# Patient Record
Sex: FEMALE | Race: WHITE | NOT HISPANIC OR LATINO | Employment: OTHER | URBAN - METROPOLITAN AREA
[De-identification: names, ages, dates, MRNs, and addresses within clinical notes are randomized per-mention and may not be internally consistent; named-entity substitution may affect disease eponyms.]

---

## 2017-06-11 DIAGNOSIS — E78.5 DYSLIPIDEMIA: ICD-10-CM

## 2017-06-12 RX ORDER — ROSUVASTATIN CALCIUM 40 MG/1
40 TABLET, COATED ORAL DAILY
Qty: 90 TAB | Refills: 0 | OUTPATIENT
Start: 2017-06-12 | End: 2017-08-09 | Stop reason: SDUPTHER

## 2017-08-09 DIAGNOSIS — E78.01 FAMILIAL HYPERCHOLESTEROLEMIA: ICD-10-CM

## 2017-08-09 RX ORDER — ROSUVASTATIN CALCIUM 40 MG/1
TABLET, COATED ORAL
Qty: 90 TAB | Refills: 1 | Status: SHIPPED | OUTPATIENT
Start: 2017-08-09 | End: 2018-04-13 | Stop reason: SDUPTHER

## 2018-04-12 ENCOUNTER — TELEPHONE (OUTPATIENT)
Dept: CARDIOLOGY | Facility: MEDICAL CENTER | Age: 73
End: 2018-04-12

## 2018-04-12 NOTE — TELEPHONE ENCOUNTER
Patient has appointment tomorrow with SC Left message for patient to see if she has had any recent labs done.

## 2018-04-13 ENCOUNTER — OFFICE VISIT (OUTPATIENT)
Dept: CARDIOLOGY | Facility: MEDICAL CENTER | Age: 73
End: 2018-04-13
Payer: MEDICARE

## 2018-04-13 ENCOUNTER — TELEPHONE (OUTPATIENT)
Dept: CARDIOLOGY | Facility: MEDICAL CENTER | Age: 73
End: 2018-04-13

## 2018-04-13 VITALS
HEIGHT: 60 IN | SYSTOLIC BLOOD PRESSURE: 136 MMHG | DIASTOLIC BLOOD PRESSURE: 69 MMHG | OXYGEN SATURATION: 97 % | BODY MASS INDEX: 27.48 KG/M2 | WEIGHT: 140 LBS | HEART RATE: 58 BPM

## 2018-04-13 DIAGNOSIS — E78.01 FAMILIAL HYPERCHOLESTEROLEMIA: Chronic | ICD-10-CM

## 2018-04-13 DIAGNOSIS — R00.2 PALPITATIONS: ICD-10-CM

## 2018-04-13 DIAGNOSIS — I25.10 CORONARY ARTERY DISEASE INVOLVING NATIVE CORONARY ARTERY OF NATIVE HEART WITHOUT ANGINA PECTORIS: Chronic | ICD-10-CM

## 2018-04-13 DIAGNOSIS — R25.2 LEG CRAMPS: ICD-10-CM

## 2018-04-13 DIAGNOSIS — F17.211 CIGARETTE NICOTINE DEPENDENCE IN REMISSION: Chronic | ICD-10-CM

## 2018-04-13 DIAGNOSIS — I72.4 PSEUDOANEURYSM OF RIGHT FEMORAL ARTERY (HCC): ICD-10-CM

## 2018-04-13 DIAGNOSIS — I10 ESSENTIAL HYPERTENSION: Chronic | ICD-10-CM

## 2018-04-13 DIAGNOSIS — R06.83 SNORING: Chronic | ICD-10-CM

## 2018-04-13 PROCEDURE — 99214 OFFICE O/P EST MOD 30 MIN: CPT | Performed by: NURSE PRACTITIONER

## 2018-04-13 RX ORDER — ALLOPURINOL 100 MG/1
100 TABLET ORAL 2 TIMES DAILY
COMMUNITY
Start: 2018-06-03

## 2018-04-13 RX ORDER — ROSUVASTATIN CALCIUM 40 MG/1
40 TABLET, COATED ORAL
Qty: 90 TAB | Refills: 3 | Status: SHIPPED | OUTPATIENT
Start: 2018-04-13 | End: 2019-04-18 | Stop reason: SDUPTHER

## 2018-04-13 RX ORDER — NITROGLYCERIN 0.4 MG/1
0.4 TABLET SUBLINGUAL PRN
Qty: 25 TAB | Refills: 11 | Status: SHIPPED | OUTPATIENT
Start: 2018-04-13

## 2018-04-13 ASSESSMENT — ENCOUNTER SYMPTOMS
ABDOMINAL PAIN: 0
COUGH: 1
DIZZINESS: 1
MYALGIAS: 0
SPUTUM PRODUCTION: 1
PND: 0
ORTHOPNEA: 0
FEVER: 0
PALPITATIONS: 1
CLAUDICATION: 0
SHORTNESS OF BREATH: 1

## 2018-04-13 NOTE — LETTER
[facility]   [address], [address]  [place] [address]  Phone: [phone]  Fax: [phone]              Funmi Veronica  1945    Encounter Date: 4/13/2018    LALI Lincoln          PROGRESS NOTE:  Chief Complaint   Patient presents with   • New Patient     CAD       Subjective:   Funmi Veronica is a 72 y.o. female who presents today for follow up on elevated BP and dizziness/palpitations.    She is a patient of Dr. Reinier Perez in our office. Hx of CAD with PCI in '06, HLD, HTN, snoring, daily cough and congestion, RA, gout, depression, and now palpitations with labile HTN.    She hasn't been seen in sometime. She tells me her BP has been very labile, high and low. She continues to feel dizzy and has palpitations.    She has leg cramps at night.    She also has a daily cough and congestion now.    She is still smoking, we discussed the importance of smoking cessation. She is interested in seeing a pulmonologist.     Past Medical History:   Diagnosis Date   • CAD (coronary artery disease) 3/12/2012    VLAD in '06   • Depression 3/12/2012   • Hyperlipidemia 3/12/2012   • Hypertension    • Nicotine dependence 3/12/2012   • Palpitations    • Rheumatoid arthritis(714.0) 3/12/2012   • Snoring 3/12/2012     History reviewed. No pertinent surgical history.  Family History   Problem Relation Age of Onset   • Heart Disease Mother 60     MI   • Heart Disease Brother 38     MI     Social History     Social History   • Marital status:      Spouse name: N/A   • Number of children: N/A   • Years of education: N/A     Occupational History   • Not on file.     Social History Main Topics   • Smoking status: Current Every Day Smoker     Packs/day: 0.50     Years: 53.00     Types: Cigarettes   • Smokeless tobacco: Never Used   • Alcohol use Yes      Comment: OCC   • Drug use: No   • Sexual activity: Not on file     Other Topics Concern   • Not on file          Social History Narrative   • No narrative on file     Allergies   Allergen Reactions   • Atorvastatin Rash     Rash     • Augmentin    • Gold Salts      Outpatient Encounter Prescriptions as of 4/13/2018   Medication Sig Dispense Refill   • allopurinol (ZYLOPRIM) 100 MG Tab Take 100 mg by mouth every day.     • metoprolol (LOPRESSOR) 25 MG Tab Take 1 Tab by mouth 2 times a day. 60 Tab 11   • rosuvastatin (CRESTOR) 40 MG tablet Take 1 Tab by mouth every bedtime. 90 Tab 3   • nitroglycerin (NITROSTAT) 0.4 MG SL Tab Place 1 Tab under tongue as needed for Chest Pain. 25 Tab 11   • Hydrocodone-Acetaminophen (VICODIN PO) Take 325 mg by mouth.     • lisinopril-hydrochlorothiazide (PRINZIDE, ZESTORETIC) 20-12.5 MG per tablet Take 1 Tab by mouth every day. 90 Tab 3   • aspirin (ASA) 81 MG CHEW Take 81 mg by mouth every day.     • [DISCONTINUED] rosuvastatin (CRESTOR) 40 MG tablet TAKE ONE TABLET BY MOUTH ONCE A DAY 90 Tab 1   • [DISCONTINUED] clonidine (CATAPRES) 0.1 MG Tab Take 1 Tab by mouth 3 times a day as needed (systolic blood pressure > 180 mmHg). 30 Tab 3   • [DISCONTINUED] nitroglycerin (NITROSTAT) 0.4 MG SL Tab Place 1 Tab under tongue as needed for Chest Pain. 25 Tab 11   • [DISCONTINUED] nitroglycerin (NITROSTAT) 0.4 MG SUBL Place 1 Tab under tongue as needed for Chest Pain. 25 Tab 11   • [DISCONTINUED] colestipol (COLESTID) 1 GM TABS Take 1 Tab by mouth 2 times a day. (Patient not taking: Reported on 11/10/2016) 180 Each 3   • [DISCONTINUED] metoprolol (LOPRESSOR) 25 MG TABS Take 1 Tab by mouth every day. 90 Each 3     No facility-administered encounter medications on file as of 4/13/2018.      Review of Systems   Constitutional: Negative for fever and malaise/fatigue.   Respiratory: Positive for cough, sputum production and shortness of breath.    Cardiovascular: Positive for palpitations. Negative for chest pain, orthopnea, claudication, leg swelling and PND.   Gastrointestinal: Negative for abdominal  pain.   Musculoskeletal: Negative for myalgias.   Neurological: Positive for dizziness. Tingling:    All other systems reviewed and are negative.       Objective:   /69   Pulse (!) 58   Ht 1.524 m (5')   Wt 63.5 kg (140 lb)   SpO2 97%   BMI 27.34 kg/m²      Physical Exam   Constitutional: She is oriented to person, place, and time. She appears well-developed and well-nourished.   HENT:   Head: Normocephalic and atraumatic.   Eyes: EOM are normal.   Neck: No JVD present.   Cardiovascular: Regular rhythm, normal heart sounds, intact distal pulses and normal pulses.  Bradycardia present.    Pulmonary/Chest: She has rhonchi in the left lower field.   Musculoskeletal: Normal range of motion. She exhibits no edema.   Neurological: She is alert and oriented to person, place, and time.   Skin: Skin is warm and dry.   Nursing note and vitals reviewed.      Assessment:     1. Coronary artery disease involving native coronary artery of native heart without angina pectoris  metoprolol (LOPRESSOR) 25 MG Tab    nitroglycerin (NITROSTAT) 0.4 MG SL Tab    ECHOCARDIOGRAM COMP W/O CONT   2. Essential hypertension  metoprolol (LOPRESSOR) 25 MG Tab    ECHOCARDIOGRAM COMP W/O CONT   3. Familial hypercholesterolemia  LIPID PANEL    COMP METABOLIC PANEL    rosuvastatin (CRESTOR) 40 MG tablet    ECHOCARDIOGRAM COMP W/O CONT   4. Pseudoaneurysm of right femoral artery, S/P repair 2006  ECHOCARDIOGRAM COMP W/O CONT   5. Snoring  OVERNIGHT PULSE OXIMETRY    ECHOCARDIOGRAM COMP W/O CONT    REFERRAL TO PULMONOLOGY   6. Cigarette nicotine dependence in remission  OVERNIGHT PULSE OXIMETRY    ECHOCARDIOGRAM COMP W/O CONT    REFERRAL TO PULMONOLOGY   7. Leg cramps     8. Palpitations  HOLTER MONITOR STUDY     Medical Decision Making:  Today's Assessment / Status / Plan:     1. CAD with prior PCI in '06  -no angina, MARADIAGA present  -recommend repeat echo, consider MPI if warranted  -cont asa lifelong, statin, and bb    2.  HTN  -labile  -split metoprolol 25 mg QD to 12.5 mg BID, cont lisnopril-HCTZ combo for now, may need stronger diuretic with MARADIAGA and Diastolic dysfunction on echo  -repeat echo  -BP log BID    3. HLD  -check CMP and lipid this year  -LDL goal <70 with CAD    4. Snoring, cough, congestion  -refer to pulmonary  -smoking cessation, talk to PCP about chantix  -OPO     5. Leg cramps  -sounds more nocturnal  -consider LE arterial duplex with smoking hx, discuss at next apt    6. Palpitations with bradycardia  -change metoprolol dosing  -check 2 day holter    FU in clinic in 2-4 weeks with SC with labs, holter, echo, and BP log    Patient verbalizes understanding and agrees with the plan of care.     Collaborating MD: NO ADD TODAY         No Recipients

## 2018-04-13 NOTE — PROGRESS NOTES
Chief Complaint   Patient presents with   • New Patient     CAD       Subjective:   Funmi Veronica is a 72 y.o. female who presents today for follow up on elevated BP and dizziness/palpitations.    She is a patient of Dr. Reinier Perez in our office. Hx of CAD with PCI in '06, HLD, HTN, snoring, daily cough and congestion, RA, gout, depression, and now palpitations with labile HTN.    She hasn't been seen in sometime. She tells me her BP has been very labile, high and low. She continues to feel dizzy and has palpitations.    She has leg cramps at night.    She also has a daily cough and congestion now.    She is still smoking, we discussed the importance of smoking cessation. She is interested in seeing a pulmonologist.     Past Medical History:   Diagnosis Date   • CAD (coronary artery disease) 3/12/2012    VLAD in '06   • Depression 3/12/2012   • Hyperlipidemia 3/12/2012   • Hypertension    • Nicotine dependence 3/12/2012   • Palpitations    • Rheumatoid arthritis(714.0) 3/12/2012   • Snoring 3/12/2012     History reviewed. No pertinent surgical history.  Family History   Problem Relation Age of Onset   • Heart Disease Mother 60     MI   • Heart Disease Brother 38     MI     Social History     Social History   • Marital status:      Spouse name: N/A   • Number of children: N/A   • Years of education: N/A     Occupational History   • Not on file.     Social History Main Topics   • Smoking status: Current Every Day Smoker     Packs/day: 0.50     Years: 53.00     Types: Cigarettes   • Smokeless tobacco: Never Used   • Alcohol use Yes      Comment: OCC   • Drug use: No   • Sexual activity: Not on file     Other Topics Concern   • Not on file     Social History Narrative   • No narrative on file     Allergies   Allergen Reactions   • Atorvastatin Rash     Rash     • Augmentin    • Gold Salts      Outpatient Encounter Prescriptions as of 4/13/2018   Medication Sig Dispense Refill   • allopurinol (ZYLOPRIM) 100 MG  Tab Take 100 mg by mouth every day.     • metoprolol (LOPRESSOR) 25 MG Tab Take 1 Tab by mouth 2 times a day. 60 Tab 11   • rosuvastatin (CRESTOR) 40 MG tablet Take 1 Tab by mouth every bedtime. 90 Tab 3   • nitroglycerin (NITROSTAT) 0.4 MG SL Tab Place 1 Tab under tongue as needed for Chest Pain. 25 Tab 11   • Hydrocodone-Acetaminophen (VICODIN PO) Take 325 mg by mouth.     • lisinopril-hydrochlorothiazide (PRINZIDE, ZESTORETIC) 20-12.5 MG per tablet Take 1 Tab by mouth every day. 90 Tab 3   • aspirin (ASA) 81 MG CHEW Take 81 mg by mouth every day.     • [DISCONTINUED] rosuvastatin (CRESTOR) 40 MG tablet TAKE ONE TABLET BY MOUTH ONCE A DAY 90 Tab 1   • [DISCONTINUED] clonidine (CATAPRES) 0.1 MG Tab Take 1 Tab by mouth 3 times a day as needed (systolic blood pressure > 180 mmHg). 30 Tab 3   • [DISCONTINUED] nitroglycerin (NITROSTAT) 0.4 MG SL Tab Place 1 Tab under tongue as needed for Chest Pain. 25 Tab 11   • [DISCONTINUED] nitroglycerin (NITROSTAT) 0.4 MG SUBL Place 1 Tab under tongue as needed for Chest Pain. 25 Tab 11   • [DISCONTINUED] colestipol (COLESTID) 1 GM TABS Take 1 Tab by mouth 2 times a day. (Patient not taking: Reported on 11/10/2016) 180 Each 3   • [DISCONTINUED] metoprolol (LOPRESSOR) 25 MG TABS Take 1 Tab by mouth every day. 90 Each 3     No facility-administered encounter medications on file as of 4/13/2018.      Review of Systems   Constitutional: Negative for fever and malaise/fatigue.   Respiratory: Positive for cough, sputum production and shortness of breath.    Cardiovascular: Positive for palpitations. Negative for chest pain, orthopnea, claudication, leg swelling and PND.   Gastrointestinal: Negative for abdominal pain.   Musculoskeletal: Negative for myalgias.   Neurological: Positive for dizziness. Tingling:    All other systems reviewed and are negative.       Objective:   /69   Pulse (!) 58   Ht 1.524 m (5')   Wt 63.5 kg (140 lb)   SpO2 97%   BMI 27.34 kg/m²     Physical  Exam   Constitutional: She is oriented to person, place, and time. She appears well-developed and well-nourished.   HENT:   Head: Normocephalic and atraumatic.   Eyes: EOM are normal.   Neck: No JVD present.   Cardiovascular: Regular rhythm, normal heart sounds, intact distal pulses and normal pulses.  Bradycardia present.    Pulmonary/Chest: She has rhonchi in the left lower field.   Musculoskeletal: Normal range of motion. She exhibits no edema.   Neurological: She is alert and oriented to person, place, and time.   Skin: Skin is warm and dry.   Nursing note and vitals reviewed.      Assessment:     1. Coronary artery disease involving native coronary artery of native heart without angina pectoris  metoprolol (LOPRESSOR) 25 MG Tab    nitroglycerin (NITROSTAT) 0.4 MG SL Tab    ECHOCARDIOGRAM COMP W/O CONT   2. Essential hypertension  metoprolol (LOPRESSOR) 25 MG Tab    ECHOCARDIOGRAM COMP W/O CONT   3. Familial hypercholesterolemia  LIPID PANEL    COMP METABOLIC PANEL    rosuvastatin (CRESTOR) 40 MG tablet    ECHOCARDIOGRAM COMP W/O CONT   4. Pseudoaneurysm of right femoral artery, S/P repair 2006  ECHOCARDIOGRAM COMP W/O CONT   5. Snoring  OVERNIGHT PULSE OXIMETRY    ECHOCARDIOGRAM COMP W/O CONT    REFERRAL TO PULMONOLOGY   6. Cigarette nicotine dependence in remission  OVERNIGHT PULSE OXIMETRY    ECHOCARDIOGRAM COMP W/O CONT    REFERRAL TO PULMONOLOGY   7. Leg cramps     8. Palpitations  HOLTER MONITOR STUDY     Medical Decision Making:  Today's Assessment / Status / Plan:     1. CAD with prior PCI in '06  -no angina, MARADIAGA present  -recommend repeat echo, consider MPI if warranted  -cont asa lifelong, statin, and bb    2. HTN  -labile  -split metoprolol 25 mg QD to 12.5 mg BID, cont lisnopril-HCTZ combo for now, may need stronger diuretic with MARADIAGA and Diastolic dysfunction on echo  -repeat echo  -BP log BID    3. HLD  -check CMP and lipid this year  -LDL goal <70 with CAD    4. Snoring, cough, congestion  -refer  to pulmonary  -smoking cessation, talk to PCP about chantix  -OPO     5. Leg cramps  -sounds more nocturnal  -consider LE arterial duplex with smoking hx, discuss at next apt    6. Palpitations with bradycardia  -change metoprolol dosing  -check 2 day holter    FU in clinic in 2-4 weeks with SC with labs, holter, echo, and BP log    Patient verbalizes understanding and agrees with the plan of care.     Collaborating MD: NO ADD TODAY

## 2018-04-14 NOTE — TELEPHONE ENCOUNTER
Message     The referral to Pulmonology will be sent to [facility]. The contact number is [phone].

## 2018-04-19 ENCOUNTER — TELEPHONE (OUTPATIENT)
Dept: CARDIOLOGY | Facility: MEDICAL CENTER | Age: 73
End: 2018-04-19

## 2018-04-20 ENCOUNTER — NON-PROVIDER VISIT (OUTPATIENT)
Dept: CARDIOLOGY | Facility: MEDICAL CENTER | Age: 73
End: 2018-04-20
Payer: MEDICARE

## 2018-04-20 DIAGNOSIS — I49.3 PVC (PREMATURE VENTRICULAR CONTRACTION): ICD-10-CM

## 2018-04-20 DIAGNOSIS — I49.1 PREMATURE ATRIAL CONTRACTION: ICD-10-CM

## 2018-04-20 DIAGNOSIS — R00.2 PALPITATIONS: ICD-10-CM

## 2018-04-23 LAB — EKG IMPRESSION: NORMAL

## 2018-04-23 PROCEDURE — 93224 XTRNL ECG REC UP TO 48 HRS: CPT | Performed by: INTERNAL MEDICINE

## 2018-04-24 ENCOUNTER — HOSPITAL ENCOUNTER (OUTPATIENT)
Dept: CARDIOLOGY | Facility: MEDICAL CENTER | Age: 73
End: 2018-04-24
Attending: NURSE PRACTITIONER
Payer: MEDICARE

## 2018-04-24 DIAGNOSIS — I25.10 CORONARY ARTERY DISEASE INVOLVING NATIVE CORONARY ARTERY OF NATIVE HEART WITHOUT ANGINA PECTORIS: Chronic | ICD-10-CM

## 2018-04-24 DIAGNOSIS — R06.83 SNORING: Chronic | ICD-10-CM

## 2018-04-24 DIAGNOSIS — E78.01 FAMILIAL HYPERCHOLESTEROLEMIA: Chronic | ICD-10-CM

## 2018-04-24 DIAGNOSIS — I72.4 PSEUDOANEURYSM OF RIGHT FEMORAL ARTERY (HCC): ICD-10-CM

## 2018-04-24 DIAGNOSIS — F17.211 CIGARETTE NICOTINE DEPENDENCE IN REMISSION: Chronic | ICD-10-CM

## 2018-04-24 DIAGNOSIS — I10 ESSENTIAL HYPERTENSION: Chronic | ICD-10-CM

## 2018-04-24 LAB
LV EJECT FRACT  99904: 65
LV EJECT FRACT MOD 2C 99903: 72.97
LV EJECT FRACT MOD 4C 99902: 66.51
LV EJECT FRACT MOD BP 99901: 68.19

## 2018-04-24 PROCEDURE — 93306 TTE W/DOPPLER COMPLETE: CPT | Mod: 26 | Performed by: INTERNAL MEDICINE

## 2018-04-24 PROCEDURE — 93306 TTE W/DOPPLER COMPLETE: CPT

## 2018-04-25 DIAGNOSIS — I10 ESSENTIAL HYPERTENSION: Chronic | ICD-10-CM

## 2018-04-25 DIAGNOSIS — I25.10 CORONARY ARTERY DISEASE INVOLVING NATIVE CORONARY ARTERY OF NATIVE HEART WITHOUT ANGINA PECTORIS: Chronic | ICD-10-CM

## 2018-05-04 ENCOUNTER — TELEPHONE (OUTPATIENT)
Dept: CARDIOLOGY | Facility: MEDICAL CENTER | Age: 73
End: 2018-05-04

## 2018-05-04 DIAGNOSIS — G47.34 NOCTURNAL HYPOXIA: ICD-10-CM

## 2018-05-04 DIAGNOSIS — R06.83 SNORING: Chronic | ICD-10-CM

## 2018-05-04 NOTE — TELEPHONE ENCOUNTER
Message     The referral to Sleep Studies will be sent to [facility]. The contact number is [phone].

## 2018-05-04 NOTE — TELEPHONE ENCOUNTER
Message   Received: Yesterday   Message Contents   LALI Lincoln R.N.             OPO + for concern for sleep apnea. Please referral to sleep study and pulmonary please. SC      Notified pt of results and recommendations. Pt was already referred to pulmonary medicine at her last appt 4/13. She has not set up an appt with pulmonary as she has not heard from them. She said our office gave her the contact number and she understands that it can take up to 6 months for an appt. Discussed the sleep study process and referral. A copy of the report will be faxed to PCP, Dr. España, this was discussed with the patient.     Referral placed to sleep studies.   Faxed OPO results to PCP at [facility] at [phone].

## 2018-05-09 ENCOUNTER — TELEPHONE (OUTPATIENT)
Dept: CARDIOLOGY | Facility: MEDICAL CENTER | Age: 73
End: 2018-05-09

## 2018-05-15 LAB
ALBUMIN SERPL-MCNC: 4.2 G/DL (ref 3.5–4.8)
ALBUMIN/GLOB SERPL: 1.6 {RATIO} (ref 1.2–2.2)
ALP SERPL-CCNC: 74 IU/L (ref 39–117)
ALT SERPL-CCNC: 21 IU/L (ref 0–32)
AST SERPL-CCNC: 24 IU/L (ref 0–40)
BILIRUB SERPL-MCNC: 0.3 MG/DL (ref 0–1.2)
BUN SERPL-MCNC: 41 MG/DL (ref 8–27)
BUN/CREAT SERPL: 28 (ref 12–28)
CALCIUM SERPL-MCNC: 9.5 MG/DL (ref 8.7–10.3)
CHLORIDE SERPL-SCNC: 106 MMOL/L (ref 96–106)
CHOLEST SERPL-MCNC: 146 MG/DL (ref 100–199)
CO2 SERPL-SCNC: 22 MMOL/L (ref 18–29)
CREAT SERPL-MCNC: 1.48 MG/DL (ref 0.57–1)
GFR SERPLBLD CREATININE-BSD FMLA CKD-EPI: 35 ML/MIN/1.73
GFR SERPLBLD CREATININE-BSD FMLA CKD-EPI: 40 ML/MIN/1.73
GLOBULIN SER CALC-MCNC: 2.6 G/DL (ref 1.5–4.5)
GLUCOSE SERPL-MCNC: 102 MG/DL (ref 65–99)
HDLC SERPL-MCNC: 72 MG/DL
LABORATORY COMMENT REPORT: NORMAL
LDLC SERPL CALC-MCNC: 52 MG/DL (ref 0–99)
POTASSIUM SERPL-SCNC: 5.5 MMOL/L (ref 3.5–5.2)
PROT SERPL-MCNC: 6.8 G/DL (ref 6–8.5)
SODIUM SERPL-SCNC: 143 MMOL/L (ref 134–144)
TRIGL SERPL-MCNC: 109 MG/DL (ref 0–149)
VLDLC SERPL CALC-MCNC: 22 MG/DL (ref 5–40)

## 2018-05-18 ENCOUNTER — OFFICE VISIT (OUTPATIENT)
Dept: CARDIOLOGY | Facility: MEDICAL CENTER | Age: 73
End: 2018-05-18
Payer: MEDICARE

## 2018-05-18 VITALS
WEIGHT: 137 LBS | SYSTOLIC BLOOD PRESSURE: 102 MMHG | BODY MASS INDEX: 26.9 KG/M2 | OXYGEN SATURATION: 95 % | HEIGHT: 60 IN | HEART RATE: 54 BPM | DIASTOLIC BLOOD PRESSURE: 60 MMHG

## 2018-05-18 DIAGNOSIS — E87.5 HYPERKALEMIA: ICD-10-CM

## 2018-05-18 DIAGNOSIS — F17.211 CIGARETTE NICOTINE DEPENDENCE IN REMISSION: Chronic | ICD-10-CM

## 2018-05-18 DIAGNOSIS — R06.83 SNORING: Chronic | ICD-10-CM

## 2018-05-18 DIAGNOSIS — I10 ESSENTIAL HYPERTENSION: Chronic | ICD-10-CM

## 2018-05-18 DIAGNOSIS — I72.4 PSEUDOANEURYSM OF RIGHT FEMORAL ARTERY (HCC): ICD-10-CM

## 2018-05-18 DIAGNOSIS — R25.2 LEG CRAMPS: ICD-10-CM

## 2018-05-18 DIAGNOSIS — I25.10 CORONARY ARTERY DISEASE INVOLVING NATIVE CORONARY ARTERY OF NATIVE HEART WITHOUT ANGINA PECTORIS: Chronic | ICD-10-CM

## 2018-05-18 DIAGNOSIS — E78.01 FAMILIAL HYPERCHOLESTEROLEMIA: Chronic | ICD-10-CM

## 2018-05-18 PROCEDURE — 99214 OFFICE O/P EST MOD 30 MIN: CPT | Performed by: NURSE PRACTITIONER

## 2018-05-18 ASSESSMENT — ENCOUNTER SYMPTOMS
CLAUDICATION: 0
ORTHOPNEA: 0
COUGH: 1
PALPITATIONS: 1
DIZZINESS: 1
MYALGIAS: 0
ABDOMINAL PAIN: 0
PND: 0
SHORTNESS OF BREATH: 1
FEVER: 0
SPUTUM PRODUCTION: 1

## 2018-05-18 NOTE — LETTER
[facility]   [address]  [place] [address]  Phone: [phone]  Fax: [phone]              Funmi Veronica  1945    Encounter Date: 5/18/2018    LALI Lincoln          PROGRESS NOTE:  Chief Complaint   Patient presents with   • Coronary Artery Disease       Subjective:   Funmi Veronica is a 72 y.o. female who presents today for follow up on labile BP and dizziness/palpitations.    She is a patient of Dr. Reinier Perez in our office. Hx of CAD with PCI in '06, HLD, HTN, snoring, daily cough and congestion, RA, gout, and depression.    She presents today with a great BP log indicating low SBP readings in the 's. Symptomatic with dizziness. Only few palpitations. She sometimes has chest pressure with exertion but only with heavy exertion.    She has leg cramps at night, but they are improved.    She is still smoking, we discussed the importance of smoking cessation. She is interested in seeing a pulmonologist, referral is pending.    Past Medical History:   Diagnosis Date   • CAD (coronary artery disease) 3/12/2012    VLAD in '06   • Depression 3/12/2012   • Hyperlipidemia 3/12/2012   • Hypertension    • Nicotine dependence 3/12/2012   • Palpitations    • Rheumatoid arthritis(714.0) 3/12/2012   • Snoring 3/12/2012     History reviewed. No pertinent surgical history.  Family History   Problem Relation Age of Onset   • Heart Disease Mother 60     MI   • Heart Disease Brother 38     MI     Social History     Social History   • Marital status:      Spouse name: N/A   • Number of children: N/A   • Years of education: N/A     Occupational History   • Not on file.     Social History Main Topics   • Smoking status: Current Every Day Smoker     Packs/day: 0.50     Years: 53.00     Types: Cigarettes   • Smokeless tobacco: Never Used   • Alcohol use Yes      Comment: OCC   • Drug use: No   • Sexual activity: Not on file     Other  Topics Concern   • Not on file     Social History Narrative   • No narrative on file     Allergies   Allergen Reactions   • Atorvastatin Rash     Rash     • Augmentin    • Gold Salts      Outpatient Encounter Prescriptions as of 5/18/2018   Medication Sig Dispense Refill   • metoprolol (LOPRESSOR) 25 MG Tab Take 0.5 Tabs by mouth 2 times a day. 60 Tab 11   • allopurinol (ZYLOPRIM) 100 MG Tab Take 100 mg by mouth every day.     • rosuvastatin (CRESTOR) 40 MG tablet Take 1 Tab by mouth every bedtime. 90 Tab 3   • Hydrocodone-Acetaminophen (VICODIN PO) Take 325 mg by mouth.     • lisinopril-hydrochlorothiazide (PRINZIDE, ZESTORETIC) 20-12.5 MG per tablet Take 1 Tab by mouth every day. 90 Tab 3   • aspirin (ASA) 81 MG CHEW Take 81 mg by mouth every day.     • nitroglycerin (NITROSTAT) 0.4 MG SL Tab Place 1 Tab under tongue as needed for Chest Pain. 25 Tab 11     No facility-administered encounter medications on file as of 5/18/2018.      Review of Systems   Constitutional: Negative for fever and malaise/fatigue.   Respiratory: Positive for cough, sputum production and shortness of breath.    Cardiovascular: Positive for palpitations. Negative for chest pain, orthopnea, claudication, leg swelling and PND.   Gastrointestinal: Negative for abdominal pain.   Musculoskeletal: Negative for myalgias.   Neurological: Positive for dizziness. Tingling:    All other systems reviewed and are negative.       Objective:   Pulse (!) 54   Ht 1.524 m (5')   Wt 62.1 kg (137 lb)   SpO2 95%   BMI 26.76 kg/m²      Physical Exam   Constitutional: She is oriented to person, place, and time. She appears well-developed and well-nourished.   HENT:   Head: Normocephalic and atraumatic.   Eyes: EOM are normal.   Neck: No JVD present.   Cardiovascular: Regular rhythm, normal heart sounds, intact distal pulses and normal pulses.  Bradycardia present.    Pulmonary/Chest: She has rhonchi in the left lower field.   Musculoskeletal: Normal range of  motion. She exhibits no edema.   Neurological: She is alert and oriented to person, place, and time.   Skin: Skin is warm and dry.   Nursing note and vitals reviewed.      Assessment:     1. Coronary artery disease involving native coronary artery of native heart without angina pectoris     2. Essential hypertension     3. Familial hypercholesterolemia     4. Leg cramps     5. Pseudoaneurysm of right femoral artery, S/P repair 2006     6. Snoring       Medical Decision Making:  Today's Assessment / Status / Plan:     1. CAD with prior PCI in '06  -chest pressure occasional with heavy exertion, consider MPI with 2 week phone call  -echo WNL  -cont asa lifelong, statin, and bb    2. HTN  -low normal with elevated K and Cr elevation  -cont metoprolol 12.5 mg BID and stop prinzide   -repeat CMP in 1 week and FU with PCP, we will call with results  -avoid high K foods in the meantime, list given    3. HLD  -LDL at goal, cont crestor  -LDL goal <70 with CAD    4. Snoring, cough, congestion  -refer to pulmonary made,  -smoking cessation, talk to PCP about resources  -not eager to quit yet  -OPO + for 70% O2 sat-FU with pulmonary    5. Leg cramps  -sounds more nocturnal  -consider LE arterial duplex with smoking hx, discuss at next apt    6. Palpitations with documented PAT on Holter  -cont metoprolol  -if palpitations continue, consider increase in bb therapy  -consider EP referral if worsens    FU in clinic in 2 weeks with SC or telephone call with BP log and lab results; 6 months with IA, referral to pulmonary number given    Patient verbalizes understanding and agrees with the plan of care.     Collaborating MD: Vamsi JUÁREZ        No Recipients

## 2018-05-18 NOTE — PROGRESS NOTES
Chief Complaint   Patient presents with   • Coronary Artery Disease       Subjective:   Funmi Veronica is a 72 y.o. female who presents today for follow up on labile BP and dizziness/palpitations.    She is a patient of Dr. Reinier Perez in our office. Hx of CAD with PCI in '06, HLD, HTN, snoring, daily cough and congestion, RA, gout, and depression.    She presents today with a great BP log indicating low SBP readings in the 's. Symptomatic with dizziness. Only few palpitations. She sometimes has chest pressure with exertion but only with heavy exertion.    She has leg cramps at night, but they are improved.    She is still smoking, we discussed the importance of smoking cessation. She is interested in seeing a pulmonologist, referral is pending.    Past Medical History:   Diagnosis Date   • CAD (coronary artery disease) 3/12/2012    VLAD in '06   • Depression 3/12/2012   • Hyperlipidemia 3/12/2012   • Hypertension    • Nicotine dependence 3/12/2012   • Palpitations    • Rheumatoid arthritis(714.0) 3/12/2012   • Snoring 3/12/2012     History reviewed. No pertinent surgical history.  Family History   Problem Relation Age of Onset   • Heart Disease Mother 60     MI   • Heart Disease Brother 38     MI     Social History     Social History   • Marital status:      Spouse name: N/A   • Number of children: N/A   • Years of education: N/A     Occupational History   • Not on file.     Social History Main Topics   • Smoking status: Current Every Day Smoker     Packs/day: 0.50     Years: 53.00     Types: Cigarettes   • Smokeless tobacco: Never Used   • Alcohol use Yes      Comment: OCC   • Drug use: No   • Sexual activity: Not on file     Other Topics Concern   • Not on file     Social History Narrative   • No narrative on file     Allergies   Allergen Reactions   • Atorvastatin Rash     Rash     • Augmentin    • Gold Salts      Outpatient Encounter Prescriptions as of 5/18/2018   Medication Sig Dispense Refill    • metoprolol (LOPRESSOR) 25 MG Tab Take 0.5 Tabs by mouth 2 times a day. 60 Tab 11   • allopurinol (ZYLOPRIM) 100 MG Tab Take 100 mg by mouth every day.     • rosuvastatin (CRESTOR) 40 MG tablet Take 1 Tab by mouth every bedtime. 90 Tab 3   • Hydrocodone-Acetaminophen (VICODIN PO) Take 325 mg by mouth.     • lisinopril-hydrochlorothiazide (PRINZIDE, ZESTORETIC) 20-12.5 MG per tablet Take 1 Tab by mouth every day. 90 Tab 3   • aspirin (ASA) 81 MG CHEW Take 81 mg by mouth every day.     • nitroglycerin (NITROSTAT) 0.4 MG SL Tab Place 1 Tab under tongue as needed for Chest Pain. 25 Tab 11     No facility-administered encounter medications on file as of 5/18/2018.      Review of Systems   Constitutional: Negative for fever and malaise/fatigue.   Respiratory: Positive for cough, sputum production and shortness of breath.    Cardiovascular: Positive for palpitations. Negative for chest pain, orthopnea, claudication, leg swelling and PND.   Gastrointestinal: Negative for abdominal pain.   Musculoskeletal: Negative for myalgias.   Neurological: Positive for dizziness. Tingling:    All other systems reviewed and are negative.       Objective:   Pulse (!) 54   Ht 1.524 m (5')   Wt 62.1 kg (137 lb)   SpO2 95%   BMI 26.76 kg/m²     Physical Exam   Constitutional: She is oriented to person, place, and time. She appears well-developed and well-nourished.   HENT:   Head: Normocephalic and atraumatic.   Eyes: EOM are normal.   Neck: No JVD present.   Cardiovascular: Regular rhythm, normal heart sounds, intact distal pulses and normal pulses.  Bradycardia present.    Pulmonary/Chest: She has rhonchi in the left lower field.   Musculoskeletal: Normal range of motion. She exhibits no edema.   Neurological: She is alert and oriented to person, place, and time.   Skin: Skin is warm and dry.   Nursing note and vitals reviewed.      Assessment:     1. Coronary artery disease involving native coronary artery of native heart without  angina pectoris     2. Essential hypertension     3. Familial hypercholesterolemia     4. Leg cramps     5. Pseudoaneurysm of right femoral artery, S/P repair 2006     6. Snoring       Medical Decision Making:  Today's Assessment / Status / Plan:     1. CAD with prior PCI in '06  -chest pressure occasional with heavy exertion, consider MPI with 2 week phone call  -echo WNL  -cont asa lifelong, statin, and bb    2. HTN  -low normal with elevated K and Cr elevation  -cont metoprolol 12.5 mg BID and stop prinzide   -repeat CMP in 1 week and FU with PCP, we will call with results  -avoid high K foods in the meantime, list given    3. HLD  -LDL at goal, cont crestor  -LDL goal <70 with CAD    4. Snoring, cough, congestion  -refer to pulmonary made,  -smoking cessation, talk to PCP about resources  -not eager to quit yet  -OPO + for 70% O2 sat-FU with pulmonary    5. Leg cramps  -sounds more nocturnal  -consider LE arterial duplex with smoking hx, discuss at next apt    6. Palpitations with documented PAT on Holter  -cont metoprolol  -if palpitations continue, consider increase in bb therapy  -consider EP referral if worsens    FU in clinic in 2 weeks with SC or telephone call with BP log and lab results; 6 months with IA, referral to pulmonary number given    Patient verbalizes understanding and agrees with the plan of care.     Collaborating MD: Vamsi JURÁEZ

## 2018-05-26 LAB
ALBUMIN SERPL-MCNC: 4.1 G/DL (ref 3.5–4.8)
ALBUMIN/GLOB SERPL: 1.6 {RATIO} (ref 1.2–2.2)
ALP SERPL-CCNC: 67 IU/L (ref 39–117)
ALT SERPL-CCNC: 30 IU/L (ref 0–32)
AST SERPL-CCNC: 37 IU/L (ref 0–40)
BILIRUB SERPL-MCNC: 0.3 MG/DL (ref 0–1.2)
BUN SERPL-MCNC: 28 MG/DL (ref 8–27)
BUN/CREAT SERPL: 20 (ref 12–28)
CALCIUM SERPL-MCNC: 9.2 MG/DL (ref 8.7–10.3)
CHLORIDE SERPL-SCNC: 107 MMOL/L (ref 96–106)
CO2 SERPL-SCNC: 21 MMOL/L (ref 18–29)
CREAT SERPL-MCNC: 1.37 MG/DL (ref 0.57–1)
GFR SERPLBLD CREATININE-BSD FMLA CKD-EPI: 39 ML/MIN/1.73
GFR SERPLBLD CREATININE-BSD FMLA CKD-EPI: 44 ML/MIN/1.73
GLOBULIN SER CALC-MCNC: 2.5 G/DL (ref 1.5–4.5)
GLUCOSE SERPL-MCNC: 142 MG/DL (ref 65–99)
POTASSIUM SERPL-SCNC: 5 MMOL/L (ref 3.5–5.2)
PROT SERPL-MCNC: 6.6 G/DL (ref 6–8.5)
SODIUM SERPL-SCNC: 142 MMOL/L (ref 134–144)

## 2018-05-30 ENCOUNTER — TELEPHONE (OUTPATIENT)
Dept: CARDIOLOGY | Facility: MEDICAL CENTER | Age: 73
End: 2018-05-30

## 2018-05-30 NOTE — TELEPHONE ENCOUNTER
COMP METABOLIC PANEL   Order: [id number]   Status:  Final result   Visible to patient:  No (Not Released)   Notes recorded by LALI Lincoln on 5/30/2018 at 8:45 AM PDT  Cr and K much better with stop of prinzide. Continue medications as is now. FU as planned. How is her BP at home and chest pain? SC         S/w pt and notified her of results and recommendations. Pt denies any blood pressure issues or chest pain and states she is doing fine.

## 2018-06-08 ENCOUNTER — TELEPHONE (OUTPATIENT)
Dept: CARDIOLOGY | Facility: MEDICAL CENTER | Age: 73
End: 2018-06-08

## 2018-06-08 ENCOUNTER — OFFICE VISIT (OUTPATIENT)
Dept: CARDIOLOGY | Facility: MEDICAL CENTER | Age: 73
End: 2018-06-08
Payer: MEDICARE

## 2018-06-08 VITALS
HEIGHT: 60 IN | BODY MASS INDEX: 27.09 KG/M2 | SYSTOLIC BLOOD PRESSURE: 128 MMHG | WEIGHT: 138 LBS | HEART RATE: 52 BPM | OXYGEN SATURATION: 96 % | DIASTOLIC BLOOD PRESSURE: 70 MMHG | RESPIRATION RATE: 12 BRPM

## 2018-06-08 DIAGNOSIS — E87.5 HYPERKALEMIA: ICD-10-CM

## 2018-06-08 DIAGNOSIS — R25.2 LEG CRAMPS: ICD-10-CM

## 2018-06-08 DIAGNOSIS — I72.4 PSEUDOANEURYSM OF RIGHT FEMORAL ARTERY (HCC): ICD-10-CM

## 2018-06-08 DIAGNOSIS — I25.10 CORONARY ARTERY DISEASE INVOLVING NATIVE CORONARY ARTERY OF NATIVE HEART WITHOUT ANGINA PECTORIS: Chronic | ICD-10-CM

## 2018-06-08 DIAGNOSIS — R06.83 SNORING: Chronic | ICD-10-CM

## 2018-06-08 DIAGNOSIS — I10 ESSENTIAL HYPERTENSION: Chronic | ICD-10-CM

## 2018-06-08 DIAGNOSIS — E78.01 FAMILIAL HYPERCHOLESTEROLEMIA: Chronic | ICD-10-CM

## 2018-06-08 PROCEDURE — 99214 OFFICE O/P EST MOD 30 MIN: CPT | Performed by: NURSE PRACTITIONER

## 2018-06-08 ASSESSMENT — ENCOUNTER SYMPTOMS
COUGH: 0
FEVER: 0
ABDOMINAL PAIN: 0
SPUTUM PRODUCTION: 0
CLAUDICATION: 0
ORTHOPNEA: 0
PALPITATIONS: 0
WEAKNESS: 1
SHORTNESS OF BREATH: 1
MYALGIAS: 0
DIZZINESS: 1
PND: 0

## 2018-06-08 NOTE — LETTER
[facility]   [address], [address]  [place] [address]  Phone: [phone]  Fax: [phone]              Funmi Veronica  1945    Encounter Date: 6/8/2018    LALI Lincoln          PROGRESS NOTE:  Chief Complaint   Patient presents with   • Coronary Artery Disease       Subjective:   Funmi Veronica is a 72 y.o. female who presents today for follow up on labile BP and dizziness/palpitation alongside intermittent chest pain with exertion.    She is a patient of Dr. Reinier Perez in our office.     Hx of CAD with PCI in '06, HLD, HTN, snoring, daily cough and congestion, RA, gout, and depression.    Her BP log indicates more control but sometimes occasional SBP in 90's or SBP in 150's.    She has leg cramps at night, but they are improved.    She has occasional chest pain but is more worried about her generalized fatigue with exertion.    She is still smoking, we discussed the importance of smoking cessation. She is interested in seeing a pulmonologist, referral is pending still.    Past Medical History:   Diagnosis Date   • CAD (coronary artery disease) 3/12/2012    VLAD in '06   • Depression 3/12/2012   • Hyperlipidemia 3/12/2012   • Hypertension    • Nicotine dependence 3/12/2012   • Palpitations    • Rheumatoid arthritis(714.0) 3/12/2012   • Snoring 3/12/2012     History reviewed. No pertinent surgical history.  Family History   Problem Relation Age of Onset   • Heart Disease Mother 60     MI   • Heart Disease Brother 38     MI     Social History     Social History   • Marital status:      Spouse name: N/A   • Number of children: N/A   • Years of education: N/A     Occupational History   • Not on file.     Social History Main Topics   • Smoking status: Current Every Day Smoker     Packs/day: 0.50     Years: 53.00     Types: Cigarettes   • Smokeless tobacco: Never Used   • Alcohol use Yes      Comment: OCC   • Drug use: No   • Sexual  activity: Not on file     Other Topics Concern   • Not on file     Social History Narrative   • No narrative on file     Allergies   Allergen Reactions   • Atorvastatin Rash     Rash     • Augmentin    • Gold Salts      Outpatient Encounter Prescriptions as of 6/8/2018   Medication Sig Dispense Refill   • metoprolol (LOPRESSOR) 25 MG Tab Take 0.5 Tabs by mouth 2 times a day. 60 Tab 11   • allopurinol (ZYLOPRIM) 100 MG Tab Take 100 mg by mouth 2 times a day.     • rosuvastatin (CRESTOR) 40 MG tablet Take 1 Tab by mouth every bedtime. 90 Tab 3   • Hydrocodone-Acetaminophen (VICODIN PO) Take 10 mg by mouth.     • aspirin (ASA) 81 MG CHEW Take 81 mg by mouth every day.     • nitroglycerin (NITROSTAT) 0.4 MG SL Tab Place 1 Tab under tongue as needed for Chest Pain. 25 Tab 11     No facility-administered encounter medications on file as of 6/8/2018.      Review of Systems   Constitutional: Negative for fever and malaise/fatigue.   Respiratory: Positive for shortness of breath. Negative for cough and sputum production.    Cardiovascular: Positive for chest pain. Negative for palpitations, orthopnea, claudication, leg swelling and PND.   Gastrointestinal: Negative for abdominal pain.   Musculoskeletal: Negative for myalgias.   Neurological: Positive for dizziness and weakness. Tingling:         Lightheadedness at times   All other systems reviewed and are negative.       Objective:   /70   Pulse (!) 52   Resp 12   Ht 1.524 m (5')   Wt 62.6 kg (138 lb)   SpO2 96%   BMI 26.95 kg/m²      Physical Exam   Constitutional: She is oriented to person, place, and time. She appears well-developed and well-nourished.   HENT:   Head: Normocephalic and atraumatic.   Eyes: EOM are normal.   Neck: No JVD present.   Cardiovascular: Regular rhythm, normal heart sounds, intact distal pulses and normal pulses.  Bradycardia present.    Pulmonary/Chest: She has rhonchi in the left lower field.   Musculoskeletal: Normal range of  motion. She exhibits no edema.   Neurological: She is alert and oriented to person, place, and time.   Skin: Skin is warm and dry.   Nursing note and vitals reviewed.      Assessment:     1. Coronary artery disease involving native coronary artery of native heart without angina pectoris  NM-CARDIAC STRESS TEST   2. Essential hypertension     3. Familial hypercholesterolemia     4. Pseudoaneurysm of right femoral artery, S/P repair 2006     5. Snoring  REFERRAL TO PULMONOLOGY    REFERRAL TO SLEEP STUDIES   6. Leg cramps     7. Hyperkalemia       Medical Decision Making:  Today's Assessment / Status / Plan:     1. CAD with prior PCI in '06  -chest pressure occasional with heavy exertion, order MPI for ischemic review; call with results   -echo WNL  -cont asa lifelong, statin, and bb    2. HTN  -K and Cr improved off prinzide but BP remains labile  -cont metoprolol unless symptomatic bradycardia persists  -consider additional anti-hypertensive if needed  -cont BP/HR log    3. HLD  -LDL at goal, cont crestor  -LDL goal <70 with CAD    4. Snoring, cough, congestion  -refer to pulmonary made, patient to call  -smoking cessation, talk to PCP about resources  -not eager to quit yet  -OPO + for 70% O2 sat,sleep study referral made    5. Leg cramps  -sounds more nocturnal  -consider LE arterial duplex in future but not applicable at this time    6. Palpitations with documented PAT on Holter  -improved palpitations  -cont metoprolol 12.5 mg BID  -bradycardia persists, if worsens stop metoprolol    FU in clinic in 4 weeks with SC with results of MPI    Patient verbalizes understanding and agrees with the plan of care.     Collaborating MD: Ervin JUÁREZ        No Recipients

## 2018-06-08 NOTE — PROGRESS NOTES
Chief Complaint   Patient presents with   • Coronary Artery Disease       Subjective:   Funmi Veronica is a 72 y.o. female who presents today for follow up on labile BP and dizziness/palpitation alongside intermittent chest pain with exertion.    She is a patient of Dr. Reinier Perez in our office.     Hx of CAD with PCI in '06, HLD, HTN, snoring, daily cough and congestion, RA, gout, and depression.    Her BP log indicates more control but sometimes occasional SBP in 90's or SBP in 150's.    She has leg cramps at night, but they are improved.    She has occasional chest pain but is more worried about her generalized fatigue with exertion.    She is still smoking, we discussed the importance of smoking cessation. She is interested in seeing a pulmonologist, referral is pending still.    Past Medical History:   Diagnosis Date   • CAD (coronary artery disease) 3/12/2012    VLAD in '06   • Depression 3/12/2012   • Hyperlipidemia 3/12/2012   • Hypertension    • Nicotine dependence 3/12/2012   • Palpitations    • Rheumatoid arthritis(714.0) 3/12/2012   • Snoring 3/12/2012     History reviewed. No pertinent surgical history.  Family History   Problem Relation Age of Onset   • Heart Disease Mother 60     MI   • Heart Disease Brother 38     MI     Social History     Social History   • Marital status:      Spouse name: N/A   • Number of children: N/A   • Years of education: N/A     Occupational History   • Not on file.     Social History Main Topics   • Smoking status: Current Every Day Smoker     Packs/day: 0.50     Years: 53.00     Types: Cigarettes   • Smokeless tobacco: Never Used   • Alcohol use Yes      Comment: OCC   • Drug use: No   • Sexual activity: Not on file     Other Topics Concern   • Not on file     Social History Narrative   • No narrative on file     Allergies   Allergen Reactions   • Atorvastatin Rash     Rash     • Augmentin    • Gold Salts      Outpatient Encounter Prescriptions as of 6/8/2018    Medication Sig Dispense Refill   • metoprolol (LOPRESSOR) 25 MG Tab Take 0.5 Tabs by mouth 2 times a day. 60 Tab 11   • allopurinol (ZYLOPRIM) 100 MG Tab Take 100 mg by mouth 2 times a day.     • rosuvastatin (CRESTOR) 40 MG tablet Take 1 Tab by mouth every bedtime. 90 Tab 3   • Hydrocodone-Acetaminophen (VICODIN PO) Take 10 mg by mouth.     • aspirin (ASA) 81 MG CHEW Take 81 mg by mouth every day.     • nitroglycerin (NITROSTAT) 0.4 MG SL Tab Place 1 Tab under tongue as needed for Chest Pain. 25 Tab 11     No facility-administered encounter medications on file as of 6/8/2018.      Review of Systems   Constitutional: Negative for fever and malaise/fatigue.   Respiratory: Positive for shortness of breath. Negative for cough and sputum production.    Cardiovascular: Positive for chest pain. Negative for palpitations, orthopnea, claudication, leg swelling and PND.   Gastrointestinal: Negative for abdominal pain.   Musculoskeletal: Negative for myalgias.   Neurological: Positive for dizziness and weakness. Tingling:         Lightheadedness at times   All other systems reviewed and are negative.       Objective:   /70   Pulse (!) 52   Resp 12   Ht 1.524 m (5')   Wt 62.6 kg (138 lb)   SpO2 96%   BMI 26.95 kg/m²     Physical Exam   Constitutional: She is oriented to person, place, and time. She appears well-developed and well-nourished.   HENT:   Head: Normocephalic and atraumatic.   Eyes: EOM are normal.   Neck: No JVD present.   Cardiovascular: Regular rhythm, normal heart sounds, intact distal pulses and normal pulses.  Bradycardia present.    Pulmonary/Chest: She has rhonchi in the left lower field.   Musculoskeletal: Normal range of motion. She exhibits no edema.   Neurological: She is alert and oriented to person, place, and time.   Skin: Skin is warm and dry.   Nursing note and vitals reviewed.      Assessment:     1. Coronary artery disease involving native coronary artery of native heart without  angina pectoris  NM-CARDIAC STRESS TEST   2. Essential hypertension     3. Familial hypercholesterolemia     4. Pseudoaneurysm of right femoral artery, S/P repair 2006     5. Snoring  REFERRAL TO PULMONOLOGY    REFERRAL TO SLEEP STUDIES   6. Leg cramps     7. Hyperkalemia       Medical Decision Making:  Today's Assessment / Status / Plan:     1. CAD with prior PCI in '06  -chest pressure occasional with heavy exertion, order MPI for ischemic review; call with results   -echo WNL  -cont asa lifelong, statin, and bb    2. HTN  -K and Cr improved off prinzide but BP remains labile  -cont metoprolol unless symptomatic bradycardia persists  -consider additional anti-hypertensive if needed  -cont BP/HR log    3. HLD  -LDL at goal, cont crestor  -LDL goal <70 with CAD    4. Snoring, cough, congestion  -refer to pulmonary made, patient to call  -smoking cessation, talk to PCP about resources  -not eager to quit yet  -OPO + for 70% O2 sat,sleep study referral made    5. Leg cramps  -sounds more nocturnal  -consider LE arterial duplex in future but not applicable at this time    6. Palpitations with documented PAT on Holter  -improved palpitations  -cont metoprolol 12.5 mg BID  -bradycardia persists, if worsens stop metoprolol    FU in clinic in 4 weeks with SC with results of MPI    Patient verbalizes understanding and agrees with the plan of care.     Collaborating MD: Ervin JUÁREZ

## 2018-06-08 NOTE — TELEPHONE ENCOUNTER
Message     The referral to Pulmonology will be sent to [facility]. The contact number is [phone].       Message     The referral to Sleep Studies will be sent to [facility]. The contact number is [phone]       Noted.

## 2018-06-13 ENCOUNTER — SLEEP CENTER VISIT (OUTPATIENT)
Dept: SLEEP MEDICINE | Facility: MEDICAL CENTER | Age: 73
End: 2018-06-13
Payer: MEDICARE

## 2018-06-13 VITALS
WEIGHT: 138 LBS | DIASTOLIC BLOOD PRESSURE: 78 MMHG | HEART RATE: 51 BPM | OXYGEN SATURATION: 95 % | HEIGHT: 60 IN | RESPIRATION RATE: 16 BRPM | BODY MASS INDEX: 27.09 KG/M2 | SYSTOLIC BLOOD PRESSURE: 122 MMHG

## 2018-06-13 DIAGNOSIS — G47.33 OSA (OBSTRUCTIVE SLEEP APNEA): ICD-10-CM

## 2018-06-13 DIAGNOSIS — I10 ESSENTIAL HYPERTENSION: Chronic | ICD-10-CM

## 2018-06-13 DIAGNOSIS — I25.10 CORONARY ARTERY DISEASE INVOLVING NATIVE CORONARY ARTERY OF NATIVE HEART WITHOUT ANGINA PECTORIS: Chronic | ICD-10-CM

## 2018-06-13 DIAGNOSIS — Z72.0 TOBACCO USE: ICD-10-CM

## 2018-06-13 PROCEDURE — 99203 OFFICE O/P NEW LOW 30 MIN: CPT | Performed by: INTERNAL MEDICINE

## 2018-06-13 RX ORDER — ZOLPIDEM TARTRATE 5 MG/1
5 TABLET ORAL NIGHTLY PRN
Qty: 3 TAB | Refills: 0 | Status: SHIPPED | OUTPATIENT
Start: 2018-06-13 | End: 2018-06-14

## 2018-06-13 NOTE — PROGRESS NOTES
"Chief Complaint   Patient presents with   • New Patient     sleep consultation       HPI: This patient is a 72 y.o. female who is referred for obstructive sleep apnea evaluation.  She has a history of coronary artery disease and labile hypertension, and follows with cardiology.  She has been told that she \"snores and stops breathing\" for the past 4 years.  She admits she wakes herself up from her snoring.  In terms of sleep habits, she goes to bed by 11 PM and reads for 30 minutes.  Once asleep she sleeps pretty consistently throughout the night although her dogs will occasionally interrupt her sleep.  She wakes up at 9 AM feeling tired.  She has a dry mouth and recently been experiencing morning headaches.  She will nap in the daytime.  Her [place] sleepiness score is 9.  She is an active smoker, denies excess caffeine or alcohol use.  She has been tapering her cigarettes to half a pack daily. Her BMI is 26. Overnight oximetry shows desaturation for 195 minutes, to a reyna of 70%.      Past Medical History:   Diagnosis Date   • CAD (coronary artery disease) 3/12/2012    VLDA in '06   • Depression 3/12/2012   • Hyperlipidemia 3/12/2012   • Hypertension    • Nicotine dependence 3/12/2012   • Palpitations    • Rheumatoid arthritis(714.0) 3/12/2012   • Snoring 3/12/2012       Social History     Social History   • Marital status:      Spouse name: N/A   • Number of children: N/A   • Years of education: N/A     Occupational History   • Not on file.     Social History Main Topics   • Smoking status: Current Every Day Smoker     Packs/day: 0.50     Years: 53.00     Types: Cigarettes   • Smokeless tobacco: Never Used   • Alcohol use Yes      Comment: occasionally   • Drug use: No   • Sexual activity: Not on file     Other Topics Concern   • Not on file     Social History Narrative   • No narrative on file       Family History   Problem Relation Age of Onset   • Heart Disease Mother 60     MI   • Heart Disease Brother " 38     MI       Current Outpatient Prescriptions on File Prior to Visit   Medication Sig Dispense Refill   • metoprolol (LOPRESSOR) 25 MG Tab Take 0.5 Tabs by mouth 2 times a day. 60 Tab 11   • allopurinol (ZYLOPRIM) 100 MG Tab Take 100 mg by mouth 2 times a day.     • rosuvastatin (CRESTOR) 40 MG tablet Take 1 Tab by mouth every bedtime. 90 Tab 3   • aspirin (ASA) 81 MG CHEW Take 81 mg by mouth every day.     • nitroglycerin (NITROSTAT) 0.4 MG SL Tab Place 1 Tab under tongue as needed for Chest Pain. 25 Tab 11   • Hydrocodone-Acetaminophen (VICODIN PO) Take 10 mg by mouth.       No current facility-administered medications on file prior to visit.        Allergies: Atorvastatin; Augmentin; and Gold salts    ROS:   Constitutional: Denies fevers, chills, night sweats, +fatigue, denies weight loss  Eyes: Denies vision loss, pain, drainage, double vision  Ears, Nose, Throat: Denies earache, difficulty hearing, tinnitus, nasal congestion, hoarseness  Cardiovascular: Denies chest pain, tightness, palpitations, orthopnea or edema  Respiratory: Denies shortness of breath, cough, wheezing, hemoptysis  Sleep: As in HPI  GI: Denies heartburn, dysphagia, nausea, abdominal pain, diarrhea or constipation  : Denies frequent urination, hematuria, discharge or painful urination  Musculoskeletal: back pain, +painful joints, sore muscles  Neurological: Denies weakness or headaches,+ lightheadedness  Skin: No rashes    Blood pressure 122/78, pulse (!) 51, resp. rate 16, height 1.524 m (5'), weight 62.6 kg (138 lb), SpO2 95 %.    Physical Exam:  Appearance: Well-nourished, well-developed, in no acute distress  HEENT: Normocephalic, atraumatic, white sclera, PERRLA, oropharynx clear, Mallampati 3  Neck: No adenopathy or masses  Respiratory: no intercostal retractions or accessory muscle use  Lungs auscultation: Clear to auscultation bilaterally  Cardiovascular: Regular rate rhythm. No murmurs, rubs or gallops.  No LE edema  Abdomen:  soft, nondistended  Gait: Normal  Digits: No clubbing, cyanosis  Motor: No focal deficits  Orientation: Oriented to time, person and place    Diagnosis:  1. BRISA (obstructive sleep apnea)  POLYSOMNOGRAPHY, 4 OR MORE    zolpidem (AMBIEN) 5 MG Tab   2. Tobacco use     3. Essential hypertension     4. Coronary artery disease involving native coronary artery of native heart without angina pectoris         Plan:  The patient has loud snoring, witnessed apneas, nonrestorative sleep and daytime somnolence, in the setting of a crowded airway and nocturnal hypoxia, with high clinical suspicion for obstructive sleep apnea syndrome.  She is an active smoker.  She also has labile hypertension which can be exacerbated by BRISA.  We discussed the pathophysiology of sleep apnea as well as its relation to cardiovascular comorbidities including hypertension and coronary artery disease.  She is following closely with her cardiologist.  We also discussed treatment with airway pressurization which she was amenable to.  We will arrange for polysomnography in the sleep laboratory for evaluation and treatment.  Smoking cessation counseling was provided.  Return for after sleep study.

## 2018-06-15 ENCOUNTER — HOSPITAL ENCOUNTER (OUTPATIENT)
Dept: RADIOLOGY | Facility: MEDICAL CENTER | Age: 73
End: 2018-06-15
Attending: NURSE PRACTITIONER
Payer: MEDICARE

## 2018-06-15 DIAGNOSIS — I25.10 CORONARY ARTERY DISEASE INVOLVING NATIVE CORONARY ARTERY OF NATIVE HEART WITHOUT ANGINA PECTORIS: Chronic | ICD-10-CM

## 2018-06-15 PROCEDURE — 700111 HCHG RX REV CODE 636 W/ 250 OVERRIDE (IP)

## 2018-06-15 PROCEDURE — A9502 TC99M TETROFOSMIN: HCPCS

## 2018-06-15 RX ORDER — REGADENOSON 0.08 MG/ML
INJECTION, SOLUTION INTRAVENOUS
Status: COMPLETED
Start: 2018-06-15 | End: 2018-06-15

## 2018-06-15 RX ADMIN — REGADENOSON 0.4 MG: 0.08 INJECTION, SOLUTION INTRAVENOUS at 10:50

## 2018-06-18 ENCOUNTER — TELEPHONE (OUTPATIENT)
Dept: CARDIOLOGY | Facility: MEDICAL CENTER | Age: 73
End: 2018-06-18

## 2018-06-18 NOTE — TELEPHONE ENCOUNTER
NM-CARDIAC STRESS TEST   Order: [id number]   Status:  Final result   Visible to patient:  No (Not Released) Dx:  Coronary artery disease involving talia...   Notes recorded by LALI Lincoln on 6/18/2018 at 10:29 AM PDT  Abnormal stress test. Recommend repeat LHC with possible PCI. Take it easy until LHC. If she has persistent chest pain, she needs to go to ER for eval. If she would like to talk to me in person regarding these results, I can fit her in my schedule this week. SC     Notified pt and her son. Answered questions as able regarding severity and treatment. Pt would like to come in to discuss with SC. Pt scheduled for 6/20 at 11:20am. Pt was advised not to do any strenuous activity and call EMS if chest pain persists. Pt verbally states understanding.

## 2018-06-20 ENCOUNTER — OFFICE VISIT (OUTPATIENT)
Dept: CARDIOLOGY | Facility: MEDICAL CENTER | Age: 73
End: 2018-06-20
Payer: MEDICARE

## 2018-06-20 ENCOUNTER — TELEPHONE (OUTPATIENT)
Dept: CARDIOLOGY | Facility: MEDICAL CENTER | Age: 73
End: 2018-06-20

## 2018-06-20 VITALS
DIASTOLIC BLOOD PRESSURE: 80 MMHG | SYSTOLIC BLOOD PRESSURE: 122 MMHG | BODY MASS INDEX: 27.29 KG/M2 | HEIGHT: 60 IN | HEART RATE: 50 BPM | OXYGEN SATURATION: 97 % | WEIGHT: 139 LBS

## 2018-06-20 DIAGNOSIS — I10 ESSENTIAL HYPERTENSION: Chronic | ICD-10-CM

## 2018-06-20 DIAGNOSIS — I25.10 CORONARY ARTERY DISEASE INVOLVING NATIVE CORONARY ARTERY OF NATIVE HEART WITHOUT ANGINA PECTORIS: Chronic | ICD-10-CM

## 2018-06-20 DIAGNOSIS — R06.83 SNORING: Chronic | ICD-10-CM

## 2018-06-20 DIAGNOSIS — E78.01 FAMILIAL HYPERCHOLESTEROLEMIA: Chronic | ICD-10-CM

## 2018-06-20 DIAGNOSIS — I72.4 PSEUDOANEURYSM OF RIGHT FEMORAL ARTERY (HCC): ICD-10-CM

## 2018-06-20 DIAGNOSIS — R94.39 POSITIVE CARDIAC STRESS TEST: ICD-10-CM

## 2018-06-20 PROBLEM — E87.5 HYPERKALEMIA: Status: RESOLVED | Noted: 2018-05-18 | Resolved: 2018-06-20

## 2018-06-20 PROBLEM — R25.2 LEG CRAMPS: Status: RESOLVED | Noted: 2018-04-13 | Resolved: 2018-06-20

## 2018-06-20 LAB — EKG IMPRESSION: NORMAL

## 2018-06-20 PROCEDURE — 99214 OFFICE O/P EST MOD 30 MIN: CPT | Performed by: NURSE PRACTITIONER

## 2018-06-20 PROCEDURE — 93000 ELECTROCARDIOGRAM COMPLETE: CPT | Performed by: NURSE PRACTITIONER

## 2018-06-20 ASSESSMENT — ENCOUNTER SYMPTOMS
COUGH: 0
ABDOMINAL PAIN: 0
CLAUDICATION: 0
PALPITATIONS: 0
SHORTNESS OF BREATH: 1
DIZZINESS: 1
SPUTUM PRODUCTION: 0
MYALGIAS: 0
PND: 0
WEAKNESS: 1
ORTHOPNEA: 0
FEVER: 0

## 2018-06-20 NOTE — TELEPHONE ENCOUNTER
Patient is scheduled on 6-26-18 for a C w/poss with Dr. Agustin per Tiarra Loaiza request. No meds to stop. Patient was told to check in at 7:00am for a 9:00 procedure. H&P was done on 6-20-18 by Tiarra OBREGON Pre admit is scheduled on 6-22-18 at 11:15.

## 2018-06-20 NOTE — LETTER
[facility]   [address], [address]  [place] [address]  Phone: [phone]  Fax: [phone]              Funmi Veronica  1945    Encounter Date: 6/20/2018    LALI Lincoln          PROGRESS NOTE:  Chief Complaint   Patient presents with   • Coronary Artery Disease       Subjective:   Funmi Veronica is a 72 y.o. female who presents today for follow up on labile BP and dizziness/palpitation alongside intermittent chest pain with exertion.    She is a patient of Dr. Reinier Perez in our office.     Hx of CAD with PCI to RCA in '06 with complication of pseudoaneurysm, HLD, HTN, snoring, daily cough and congestion, RA, gout, and depression.    Her BP log indicates more control but sometimes occasional SBP in 90's or SBP in 160's.    She has leg cramps at night, but they are improved now that K has been corrected.    She has occasional chest pain, she is very inconsistent with her stories on what her chest pain feels like, sometimes she has chest tightness but sometimes its sharp pains (points under her left breast).     Her son and I urged her to be more aware of her symptoms. If she has persistent chest pain, she knows to utilize the ER.    She is still smoking, we discussed the importance of smoking cessation. She is interested in seeing a pulmonologist, consult pending after her sleep study.    Past Medical History:   Diagnosis Date   • CAD (coronary artery disease) 3/12/2012    VLAD to RCA in '06 with complication of pseudoanuerysm in R fem site   • Depression 3/12/2012   • Hyperlipidemia 3/12/2012   • Hypertension    • Nicotine dependence 3/12/2012   • Palpitations    • Rheumatoid arthritis(714.0) 3/12/2012   • Snoring 3/12/2012     Past Surgical History:   Procedure Laterality Date   • CARDIAC CATH       Family History   Problem Relation Age of Onset   • Heart Disease Mother 60     MI   • Heart Disease Brother 38     MI     Social History          Social History   • Marital status:      Spouse name: N/A   • Number of children: N/A   • Years of education: N/A     Occupational History   • Not on file.     Social History Main Topics   • Smoking status: Current Every Day Smoker     Packs/day: 0.50     Years: 53.00     Types: Cigarettes   • Smokeless tobacco: Never Used   • Alcohol use Yes      Comment: occasionally   • Drug use: No   • Sexual activity: Not on file     Other Topics Concern   • Not on file     Social History Narrative   • No narrative on file     Allergies   Allergen Reactions   • Atorvastatin Rash     Rash     • Augmentin    • Gold Salts      Outpatient Encounter Prescriptions as of 6/20/2018   Medication Sig Dispense Refill   • metoprolol (LOPRESSOR) 25 MG Tab Take 0.5 Tabs by mouth 2 times a day. 60 Tab 11   • allopurinol (ZYLOPRIM) 100 MG Tab Take 100 mg by mouth 2 times a day.     • rosuvastatin (CRESTOR) 40 MG tablet Take 1 Tab by mouth every bedtime. 90 Tab 3   • aspirin (ASA) 81 MG CHEW Take 81 mg by mouth every day.     • nitroglycerin (NITROSTAT) 0.4 MG SL Tab Place 1 Tab under tongue as needed for Chest Pain. 25 Tab 11   • Hydrocodone-Acetaminophen (VICODIN PO) Take 10 mg by mouth.       No facility-administered encounter medications on file as of 6/20/2018.      Review of Systems   Constitutional: Positive for malaise/fatigue. Negative for fever.   Respiratory: Positive for shortness of breath. Negative for cough and sputum production.         Exertional and during sleep   Cardiovascular: Positive for chest pain. Negative for palpitations, orthopnea, claudication, leg swelling and PND.        Now with exertion and seldomly sharp pains at rest    Gastrointestinal: Negative for abdominal pain.   Musculoskeletal: Negative for myalgias.   Neurological: Positive for dizziness and weakness. Tingling:         Lightheadedness at times   All other systems reviewed and are negative.       Objective:   /80   Pulse (!) 50   Ht  1.524 m (5')   Wt 63 kg (139 lb)   SpO2 97%   BMI 27.15 kg/m²      Physical Exam   Constitutional: She is oriented to person, place, and time. She appears well-developed and well-nourished.   HENT:   Head: Normocephalic and atraumatic.   Eyes: EOM are normal.   Neck: No JVD present.   Cardiovascular: Regular rhythm, normal heart sounds, intact distal pulses and normal pulses.  Bradycardia present.    Pulmonary/Chest: Effort normal. No respiratory distress. She has rhonchi in the left lower field.   Musculoskeletal: Normal range of motion. She exhibits no edema.   Neurological: She is alert and oriented to person, place, and time.   Skin: Skin is warm and dry.   Nursing note and vitals reviewed.      Assessment:     1. Coronary artery disease involving native coronary artery of native heart without angina pectoris  EKG   2. Essential hypertension     3. Familial hypercholesterolemia     4. Pseudoaneurysm of right femoral artery, S/P repair 2006     5. Snoring     6. Positive cardiac stress test  EKG     Medical Decision Making:  Today's Assessment / Status / Plan:     1. CAD with prior PCI to RCA in '06  -chest pressure remains occasional with heavy exertion, but her symptoms are inconsistent with prior visits where chest pain was more sharp and pinpoint associated with palpitations  -MPI abnormal with + reversibility in anterior region, EKG confirms randall-septal infarct (old)  -recommend [facility] with possible PCI  -discussed risks, benefits, and alternatives discussed   -she knows to utilize ER for worsening symptoms  -echo WNL  -cont asa lifelong, statin, and bb    2. HTN  -K and Cr improved off prinzide but BP remains labile  -cont metoprolol unless symptomatic bradycardia persists  -consider additional anti-hypertensive after angiogram  -cont BP/HR log    3. HLD  -LDL at goal, cont crestor  -LDL goal <70 with CAD    4. Snoring, cough, congestion  -refer to pulmonary made, patient to call  -smoking cessation,  talked to PCP about resources  -not eager to quit yet  -OPO + for 70% O2 sat,sleep study referral made and apt pending    5. Leg cramps  -sounds more nocturnal  -improved with K correction    6. Palpitations with documented PAT on Holter  -improved palpitations  -cont metoprolol 12.5 mg BID  -bradycardia persists, if worsens stop metoprolol    FU in clinic in 2 weeks after angiogram with SC    Patient verbalizes understanding and agrees with the plan of care.     Collaborating MD: Charley JUÁREZ        No Recipients

## 2018-06-20 NOTE — PROGRESS NOTES
Chief Complaint   Patient presents with   • Coronary Artery Disease       Subjective:   Funmi Veronica is a 72 y.o. female who presents today for follow up on labile BP and dizziness/palpitation alongside intermittent chest pain with exertion.    She is a patient of Dr. Reinier Perez in our office.     Hx of CAD with PCI to RCA in '06 with complication of pseudoaneurysm, HLD, HTN, snoring, daily cough and congestion, RA, gout, and depression.    Her BP log indicates more control but sometimes occasional SBP in 90's or SBP in 160's.    She has leg cramps at night, but they are improved now that K has been corrected.    She has occasional chest pain, she is very inconsistent with her stories on what her chest pain feels like, sometimes she has chest tightness but sometimes its sharp pains (points under her left breast).     Her son and I urged her to be more aware of her symptoms. If she has persistent chest pain, she knows to utilize the ER.    She is still smoking, we discussed the importance of smoking cessation. She is interested in seeing a pulmonologist, consult pending after her sleep study.    Past Medical History:   Diagnosis Date   • CAD (coronary artery disease) 3/12/2012    VLAD to RCA in '06 with complication of pseudoanuerysm in R fem site   • Depression 3/12/2012   • Hyperlipidemia 3/12/2012   • Hypertension    • Nicotine dependence 3/12/2012   • Palpitations    • Rheumatoid arthritis(714.0) 3/12/2012   • Snoring 3/12/2012     Past Surgical History:   Procedure Laterality Date   • CARDIAC CATH       Family History   Problem Relation Age of Onset   • Heart Disease Mother 60     MI   • Heart Disease Brother 38     MI     Social History     Social History   • Marital status:      Spouse name: N/A   • Number of children: N/A   • Years of education: N/A     Occupational History   • Not on file.     Social History Main Topics   • Smoking status: Current Every Day Smoker     Packs/day: 0.50     Years:  53.00     Types: Cigarettes   • Smokeless tobacco: Never Used   • Alcohol use Yes      Comment: occasionally   • Drug use: No   • Sexual activity: Not on file     Other Topics Concern   • Not on file     Social History Narrative   • No narrative on file     Allergies   Allergen Reactions   • Atorvastatin Rash     Rash     • Augmentin    • Gold Salts      Outpatient Encounter Prescriptions as of 6/20/2018   Medication Sig Dispense Refill   • metoprolol (LOPRESSOR) 25 MG Tab Take 0.5 Tabs by mouth 2 times a day. 60 Tab 11   • allopurinol (ZYLOPRIM) 100 MG Tab Take 100 mg by mouth 2 times a day.     • rosuvastatin (CRESTOR) 40 MG tablet Take 1 Tab by mouth every bedtime. 90 Tab 3   • aspirin (ASA) 81 MG CHEW Take 81 mg by mouth every day.     • nitroglycerin (NITROSTAT) 0.4 MG SL Tab Place 1 Tab under tongue as needed for Chest Pain. 25 Tab 11   • Hydrocodone-Acetaminophen (VICODIN PO) Take 10 mg by mouth.       No facility-administered encounter medications on file as of 6/20/2018.      Review of Systems   Constitutional: Positive for malaise/fatigue. Negative for fever.   Respiratory: Positive for shortness of breath. Negative for cough and sputum production.         Exertional and during sleep   Cardiovascular: Positive for chest pain. Negative for palpitations, orthopnea, claudication, leg swelling and PND.        Now with exertion and seldomly sharp pains at rest    Gastrointestinal: Negative for abdominal pain.   Musculoskeletal: Negative for myalgias.   Neurological: Positive for dizziness and weakness. Tingling:         Lightheadedness at times   All other systems reviewed and are negative.       Objective:   /80   Pulse (!) 50   Ht 1.524 m (5')   Wt 63 kg (139 lb)   SpO2 97%   BMI 27.15 kg/m²     Physical Exam   Constitutional: She is oriented to person, place, and time. She appears well-developed and well-nourished.   HENT:   Head: Normocephalic and atraumatic.   Eyes: EOM are normal.   Neck: No  JVD present.   Cardiovascular: Regular rhythm, normal heart sounds, intact distal pulses and normal pulses.  Bradycardia present.    Pulmonary/Chest: Effort normal. No respiratory distress. She has rhonchi in the left lower field.   Musculoskeletal: Normal range of motion. She exhibits no edema.   Neurological: She is alert and oriented to person, place, and time.   Skin: Skin is warm and dry.   Nursing note and vitals reviewed.      Assessment:     1. Coronary artery disease involving native coronary artery of native heart without angina pectoris  EKG   2. Essential hypertension     3. Familial hypercholesterolemia     4. Pseudoaneurysm of right femoral artery, S/P repair 2006     5. Snoring     6. Positive cardiac stress test  EKG     Medical Decision Making:  Today's Assessment / Status / Plan:     1. CAD with prior PCI to RCA in '06  -chest pressure remains occasional with heavy exertion, but her symptoms are inconsistent with prior visits where chest pain was more sharp and pinpoint associated with palpitations  -MPI abnormal with + reversibility in anterior region, EKG confirms randall-septal infarct (old)  -recommend [facility] with possible PCI  -discussed risks, benefits, and alternatives discussed   -she knows to utilize ER for worsening symptoms  -echo WNL  -cont asa lifelong, statin, and bb    2. HTN  -K and Cr improved off prinzide but BP remains labile  -cont metoprolol unless symptomatic bradycardia persists  -consider additional anti-hypertensive after angiogram  -cont BP/HR log    3. HLD  -LDL at goal, cont crestor  -LDL goal <70 with CAD    4. Snoring, cough, congestion  -refer to pulmonary made, patient to call  -smoking cessation, talked to PCP about resources  -not eager to quit yet  -OPO + for 70% O2 sat,sleep study referral made and apt pending    5. Leg cramps  -sounds more nocturnal  -improved with K correction    6. Palpitations with documented PAT on Holter  -improved palpitations  -cont  metoprolol 12.5 mg BID  -bradycardia persists, if worsens stop metoprolol    FU in clinic in 2 weeks after angiogram with SC    Patient verbalizes understanding and agrees with the plan of care.     Collaborating MD: Charley JUÁREZ

## 2018-06-26 ENCOUNTER — HOSPITAL ENCOUNTER (OUTPATIENT)
Facility: MEDICAL CENTER | Age: 73
End: 2018-06-26
Attending: INTERNAL MEDICINE | Admitting: INTERNAL MEDICINE
Payer: MEDICARE

## 2018-06-26 ENCOUNTER — APPOINTMENT (OUTPATIENT)
Dept: RADIOLOGY | Facility: MEDICAL CENTER | Age: 73
End: 2018-06-26
Attending: INTERNAL MEDICINE
Payer: MEDICARE

## 2018-06-26 VITALS
RESPIRATION RATE: 14 BRPM | HEART RATE: 52 BPM | OXYGEN SATURATION: 96 % | TEMPERATURE: 98.2 F | BODY MASS INDEX: 23.14 KG/M2 | HEIGHT: 65 IN | WEIGHT: 138.89 LBS | DIASTOLIC BLOOD PRESSURE: 55 MMHG | SYSTOLIC BLOOD PRESSURE: 128 MMHG

## 2018-06-26 LAB
ALBUMIN SERPL BCP-MCNC: 3.8 G/DL (ref 3.2–4.9)
ALBUMIN/GLOB SERPL: 1.4 G/DL
ALP SERPL-CCNC: 67 U/L (ref 30–99)
ALT SERPL-CCNC: 27 U/L (ref 2–50)
ANION GAP SERPL CALC-SCNC: 8 MMOL/L (ref 0–11.9)
APTT PPP: 28.2 SEC (ref 24.7–36)
AST SERPL-CCNC: 26 U/L (ref 12–45)
BILIRUB SERPL-MCNC: 0.3 MG/DL (ref 0.1–1.5)
BUN SERPL-MCNC: 38 MG/DL (ref 8–22)
CALCIUM SERPL-MCNC: 8.9 MG/DL (ref 8.5–10.5)
CHLORIDE SERPL-SCNC: 113 MMOL/L (ref 96–112)
CO2 SERPL-SCNC: 21 MMOL/L (ref 20–33)
CREAT SERPL-MCNC: 1.4 MG/DL (ref 0.5–1.4)
EKG IMPRESSION: NORMAL
ERYTHROCYTE [DISTWIDTH] IN BLOOD BY AUTOMATED COUNT: 48.8 FL (ref 35.9–50)
GLOBULIN SER CALC-MCNC: 2.7 G/DL (ref 1.9–3.5)
GLUCOSE SERPL-MCNC: 103 MG/DL (ref 65–99)
HCT VFR BLD AUTO: 38.6 % (ref 37–47)
HGB BLD-MCNC: 12.3 G/DL (ref 12–16)
INR PPP: 0.94 (ref 0.87–1.13)
MCH RBC QN AUTO: 30.4 PG (ref 27–33)
MCHC RBC AUTO-ENTMCNC: 31.9 G/DL (ref 33.6–35)
MCV RBC AUTO: 95.3 FL (ref 81.4–97.8)
PLATELET # BLD AUTO: 158 K/UL (ref 164–446)
PMV BLD AUTO: 10.5 FL (ref 9–12.9)
POTASSIUM SERPL-SCNC: 5.2 MMOL/L (ref 3.6–5.5)
PROT SERPL-MCNC: 6.5 G/DL (ref 6–8.2)
PROTHROMBIN TIME: 12.3 SEC (ref 12–14.6)
RBC # BLD AUTO: 4.05 M/UL (ref 4.2–5.4)
SODIUM SERPL-SCNC: 142 MMOL/L (ref 135–145)
WBC # BLD AUTO: 7.9 K/UL (ref 4.8–10.8)

## 2018-06-26 PROCEDURE — 99152 MOD SED SAME PHYS/QHP 5/>YRS: CPT

## 2018-06-26 PROCEDURE — 160002 HCHG RECOVERY MINUTES (STAT)

## 2018-06-26 PROCEDURE — 93005 ELECTROCARDIOGRAM TRACING: CPT | Performed by: INTERNAL MEDICINE

## 2018-06-26 PROCEDURE — 700111 HCHG RX REV CODE 636 W/ 250 OVERRIDE (IP)

## 2018-06-26 PROCEDURE — 85730 THROMBOPLASTIN TIME PARTIAL: CPT

## 2018-06-26 PROCEDURE — 93458 L HRT ARTERY/VENTRICLE ANGIO: CPT

## 2018-06-26 PROCEDURE — 71046 X-RAY EXAM CHEST 2 VIEWS: CPT

## 2018-06-26 PROCEDURE — 93010 ELECTROCARDIOGRAM REPORT: CPT | Performed by: INTERNAL MEDICINE

## 2018-06-26 PROCEDURE — 360979 HCHG DIAGNOSTIC CATH

## 2018-06-26 PROCEDURE — C1894 INTRO/SHEATH, NON-LASER: HCPCS

## 2018-06-26 PROCEDURE — 85027 COMPLETE CBC AUTOMATED: CPT

## 2018-06-26 PROCEDURE — 307093 HCHG TR BAND RADIAL

## 2018-06-26 PROCEDURE — 80053 COMPREHEN METABOLIC PANEL: CPT

## 2018-06-26 PROCEDURE — 85610 PROTHROMBIN TIME: CPT

## 2018-06-26 PROCEDURE — C1769 GUIDE WIRE: HCPCS

## 2018-06-26 RX ORDER — ONDANSETRON 2 MG/ML
4 INJECTION INTRAMUSCULAR; INTRAVENOUS EVERY 4 HOURS PRN
Status: DISCONTINUED | OUTPATIENT
Start: 2018-06-26 | End: 2018-06-26 | Stop reason: HOSPADM

## 2018-06-26 RX ORDER — DEXAMETHASONE SODIUM PHOSPHATE 4 MG/ML
4 INJECTION, SOLUTION INTRA-ARTICULAR; INTRALESIONAL; INTRAMUSCULAR; INTRAVENOUS; SOFT TISSUE
Status: DISCONTINUED | OUTPATIENT
Start: 2018-06-26 | End: 2018-06-26 | Stop reason: HOSPADM

## 2018-06-26 RX ORDER — SCOLOPAMINE TRANSDERMAL SYSTEM 1 MG/1
1 PATCH, EXTENDED RELEASE TRANSDERMAL
Status: DISCONTINUED | OUTPATIENT
Start: 2018-06-26 | End: 2018-06-26 | Stop reason: HOSPADM

## 2018-06-26 RX ORDER — SODIUM CHLORIDE 9 MG/ML
INJECTION, SOLUTION INTRAVENOUS CONTINUOUS
Status: ACTIVE | OUTPATIENT
Start: 2018-06-26 | End: 2018-06-26

## 2018-06-26 RX ORDER — SODIUM CHLORIDE 9 MG/ML
INJECTION, SOLUTION INTRAVENOUS
Status: DISCONTINUED | OUTPATIENT
Start: 2018-06-26 | End: 2018-06-26

## 2018-06-26 RX ORDER — VERAPAMIL HYDROCHLORIDE 2.5 MG/ML
INJECTION, SOLUTION INTRAVENOUS
Status: COMPLETED
Start: 2018-06-26 | End: 2018-06-26

## 2018-06-26 RX ORDER — MIDAZOLAM HYDROCHLORIDE 1 MG/ML
INJECTION INTRAMUSCULAR; INTRAVENOUS
Status: COMPLETED
Start: 2018-06-26 | End: 2018-06-26

## 2018-06-26 RX ORDER — HALOPERIDOL 5 MG/ML
1 INJECTION INTRAMUSCULAR EVERY 6 HOURS PRN
Status: DISCONTINUED | OUTPATIENT
Start: 2018-06-26 | End: 2018-06-26 | Stop reason: HOSPADM

## 2018-06-26 RX ORDER — DIPHENHYDRAMINE HYDROCHLORIDE 50 MG/ML
25 INJECTION INTRAMUSCULAR; INTRAVENOUS EVERY 6 HOURS PRN
Status: DISCONTINUED | OUTPATIENT
Start: 2018-06-26 | End: 2018-06-26 | Stop reason: HOSPADM

## 2018-06-26 RX ORDER — HEPARIN SODIUM,PORCINE 1000/ML
VIAL (ML) INJECTION
Status: COMPLETED
Start: 2018-06-26 | End: 2018-06-26

## 2018-06-26 RX ADMIN — HEPARIN SODIUM: 1000 INJECTION, SOLUTION INTRAVENOUS; SUBCUTANEOUS at 09:03

## 2018-06-26 RX ADMIN — LIDOCAINE HYDROCHLORIDE 20 MG: 20 INJECTION, SOLUTION INTRAVENOUS at 08:30

## 2018-06-26 RX ADMIN — MIDAZOLAM 2 MG: 1 INJECTION INTRAMUSCULAR; INTRAVENOUS at 12:02

## 2018-06-26 RX ADMIN — HEPARIN SODIUM 2000 UNITS: 200 INJECTION, SOLUTION INTRAVENOUS at 09:03

## 2018-06-26 RX ADMIN — VERAPAMIL HYDROCHLORIDE 5 MG: 2.5 INJECTION, SOLUTION INTRAVENOUS at 09:04

## 2018-06-26 RX ADMIN — SODIUM CHLORIDE: 9 INJECTION, SOLUTION INTRAVENOUS at 08:21

## 2018-06-26 RX ADMIN — FENTANYL CITRATE 100 MCG: 50 INJECTION, SOLUTION INTRAMUSCULAR; INTRAVENOUS at 12:02

## 2018-06-26 RX ADMIN — SODIUM CHLORIDE: 9 INJECTION, SOLUTION INTRAVENOUS at 12:30

## 2018-06-26 RX ADMIN — NITROGLYCERIN 10 ML: 20 INJECTION INTRAVENOUS at 09:03

## 2018-06-26 ASSESSMENT — PAIN SCALES - GENERAL
PAINLEVEL_OUTOF10: 0

## 2018-06-26 NOTE — OR NURSING
Assumed care of patient at approx 1445.  Patient alert and oriented x 4. See flowsheets for VS. Pain is rated 0/10.     Call light and personal belongings within reach. Gurney in lowest position. Monitor alarms set appropriately.    Dressing is CDI . See flowsheets for detailed wound documentation.

## 2018-06-26 NOTE — PROCEDURES
DATE OF SERVICE:  06/26/2018    REFERRING PHYSICIAN:  PASCUAL Garcia and Dr. Reinier Perez.    PROCEDURES:  1.  Left heart catheterization.  2.  Coronary angiography.  3.  Left ventriculogram.    PREPROCEDURE DIAGNOSIS:  Chest pain.    POSTPROCEDURE DIAGNOSES:  1.  Nonobstructive coronary artery disease of the mid left anterior descending   artery and mid posterior descending artery as well as patent stent in the mid   right coronary artery of a very small caliber system.  2.  Normal left ventricular systolic function with ejection fraction of 55%.  3.  Elevated left ventricular end-diastolic pressure.    INDICATION:  The patient is a 72-year-old female with past medical history   significant for CAD with remote stent placement x2 of the right coronary   artery in 2006 and chronic tobacco abuse.  She has been experiencing chest   pain and was scheduled for cardiac catheterization.    DESCRIPTION OF PROCEDURE:  After informed consent was signed by the   patient, the patient was brought to the cardiac catheterization laboratory.  She   was prepped and draped in the usual sterile manner.  The right wrist area was   anesthetized with 2% Xylocaine.  Attempts were made to cannulate the right   radial artery; however, this was unsuccessful.  The ultrasound was used to   relook at the radial artery, which showed small artery with diffuse plaque.    The left inguinal area was anesthetized with 2% Xylocaine.  A 4-Icelandic sheath   was inserted into the left femoral artery using the modified Seldinger   technique under ultrasound guidance.  A 4-Icelandic pigtail catheter was   positioned into the left ventricle.  Left angiography was performed.  This was   exchanged for a JL4 catheter, which was positioned into the left main   coronary artery.  Coronary angiography was performed.  This was exchanged for   a 3DRC catheter, which was positioned into the right coronary artery.    Coronary angiography was performed.  The  patient tolerated the procedure well.    At the end of procedure, all catheters and sheaths were removed.  She was   then transferred to PPU in stable condition.    HEMODYNAMIC DATA:  Hemodynamic data shows aortic pressures of 160/60 with mean   of 90 mmHg and /0 with LVEDP of 18 mmHg.    AORTIC VALVE:  There was no significant gradient noted.    LEFT VENTRICULOGRAM:  A 10 mL of contrast was delivered for 3 seconds.    Ejection fraction was estimated to be 55%.  There was no segmental wall motion   abnormalities noted.    ANGIOGRAM:  Left main coronary artery:  Left main coronary artery is a moderate caliber,   moderate length vessel free of disease.  Left anterior descending artery:  Left anterior descending artery is a long,   small-to-moderate caliber vessel, which wraps around the apex.  Mid portion of   the vessel, there are concentric 50% to 60% stenosis.  There are very small   caliber diagonal branches noted free of disease.  Left circumflex artery:  Left circumflex artery is a nondominant vessel with   long bifurcating moderate first obtuse marginal branch, various small   bifurcating second obtuse marginal branch and very small left posterolateral   branch.  Left circumflex artery and its branches are free of disease.  Right coronary artery:  Right coronary artery is a dominant moderate-caliber   vessel with patent stent in the mid portion.  Distally, there are moderate   posterior descending artery and small posterior lateral branches noted free of   disease.    IMPRESSION:  1.  Nonobstructive coronary artery disease of the mid left anterior descending   artery and mid posterior descending artery as well as patent stent in the mid   right coronary artery of a very small caliber system.  2.  Normal left ventricular systolic function with ejection fraction of 55%.  3.  Elevated left ventricular end-diastolic pressure.    RECOMMENDATIONS:  Recommend medical therapy and smoking cessation.        ____________________________________     MD PATRICIA DAVIS    DD:  06/26/2018 12:01:58  DT:  06/26/2018 12:18:03    D#:  [id number]  Job#:  [id number]

## 2018-06-26 NOTE — OR NURSING
1231 Patient arrived from cath lab, left groin CDI, soft.  Patient updated on plan of care, verbalized understanding.  1330 Groin CDI, soft.  1430 Patient's son called and updated on plan of care.  Groin CDI, soft.  Report to Ana POWELL  [id number] Patient transferred to Phase 2.

## 2018-06-26 NOTE — DISCHARGE INSTRUCTIONS
ACTIVITY: Rest and take it easy for the first 24 hours.  A responsible adult is recommended to remain with you during that time.  It is normal to feel sleepy.  We encourage you to not do anything that requires balance, judgment or coordination.    MILD FLU-LIKE SYMPTOMS ARE NORMAL. YOU MAY EXPERIENCE GENERALIZED MUSCLE ACHES, THROAT IRRITATION, HEADACHE AND/OR SOME NAUSEA.    FOR 24 HOURS DO NOT:  Drive, operate machinery or run household appliances.  Drink beer or alcoholic beverages.   Make important decisions or sign legal documents.    SPECIAL INSTRUCTIONS: Follow MD instructions      DIET: To avoid nausea, slowly advance diet as tolerated, avoiding spicy or greasy foods for the first day.  Add more substantial food to your diet according to your physician's instructions.  Babies can be fed formula or breast milk as soon as they are hungry.  INCREASE FLUIDS AND FIBER TO AVOID CONSTIPATION.    SURGICAL DRESSING/BATHING: Keep dressing and incision dry for 24 hours, may shower and remove dressing after 4 PM n    FOLLOW-UP APPOINTMENT:  A follow-up appointment should be arranged with your doctor in 1-2 weeks; call to schedule.    You should CALL YOUR PHYSICIAN if you develop:  Fever greater than 101 degrees F.  Pain not relieved by medication, or persistent nausea or vomiting.  Excessive bleeding (blood soaking through dressing) or unexpected drainage from the wound.  Extreme redness or swelling around the incision site, drainage of pus or foul smelling drainage.  Inability to urinate or empty your bladder within 8 hours.  Problems with breathing or chest pain.    You should call 911 if you develop problems with breathing or chest pain.  If you are unable to contact your doctor or surgical center, you should go to the nearest emergency room or urgent care center.  Physician's telephone #: [phone]    If any questions arise, call your doctor.  If your doctor is not available, please feel free to call the  Surgical Center at [phone].  The Center is open Monday through Friday from 7AM to 7PM.  You can also call the HEALTH HOTLINE open 24 hours/day, 7 days/week and speak to a nurse at [phone], or toll free at [phone].    A registered nurse may call you a few days after your surgery to see how you are doing after your procedure.    MEDICATIONS: Resume taking daily medication.  Take prescribed pain medication with food.  If no medication is prescribed, you may take non-aspirin pain medication if needed.  PAIN MEDICATION CAN BE VERY CONSTIPATING.  Take a stool softener or laxative such as senokot, pericolace, or milk of magnesia if needed.    If your physician has prescribed pain medication that includes Acetaminophen (Tylenol), do not take additional Acetaminophen (Tylenol) while taking the prescribed medication.    Depression / Suicide Risk    As you are discharged from this [facility] facility, it is important to learn how to keep safe from harming yourself.    Recognize the warning signs:  · Abrupt changes in personality, positive or negative- including increase in energy   · Giving away possessions  · Change in eating patterns- significant weight changes-  positive or negative  · Change in sleeping patterns- unable to sleep or sleeping all the time   · Unwillingness or inability to communicate  · Depression  · Unusual sadness, discouragement and loneliness  · Talk of wanting to die  · Neglect of personal appearance   · Rebelliousness- reckless behavior  · Withdrawal from people/activities they love  · Confusion- inability to concentrate     If you or a loved one observes any of these behaviors or has concerns about self-harm, here's what you can do:  · Talk about it- your feelings and reasons for harming yourself  · Remove any means that you might use to hurt yourself (examples: pills, rope, extension cords, firearm)  · Get professional help from the community (Mental Health, Substance Abuse,  "psychological counseling)  · Do not be alone:Call your Safe Contact- someone whom you trust who will be there for you.  · Call your local CRISIS HOTLINE 603-2467 or 212-549-2993  · Call your local Children's Mobile Crisis Response Team [place] (127) 024-4621 or www."[employer]  · Call the toll free National Suicide Prevention Hotlines   · National Suicide Prevention Lifeline 960-355-LLCQ (6115)  · [place] Hope Line Network 800-SUICIDE (859-9599)    Post Angiogram Groin Care Instructions     INSTRUCTIONS  2. Examine (look and feel) the site of your incision site TODAY so you can recognize changes that should be called to your doctor (see below).  3. Avoid straining either by lifting or pulling objects for 4-5 days. Avoid lifting over 5 pounds.   4. For at least 72 hours, if you should sneeze or cough, please hold pressure over your groin area.  5. If you should begin to have oozing from the catheterization site, please hold firm pressure and call your doctor's office immediately.  6. If profuse bleeding occurs from the catheterization site, hold firm pressure and call \"[phone]\" immediately for assistance.  7. Remove bandage after 24 hours.     ACTIVITY  2. Limit activity as instructed by your doctor.  3. No driving or very limited driving with frequent stops for one week.   4. If you must take a long car ride, stop every hour and walk around the car.   5. Warm showers or baths are permitted after the bandage is removed. Avoid hot showers, baths, hot tubs, and swimming for one week.    PLEASE CALL YOUR DOCTOR IF:  1. Temperature elevation occurs.  2. Catheterization site becomes reddened or begins to drain.   3. Bruising appears to be new or not resolving. The bruise may move down your leg. This is normal.  4. The small round lump in the groin increases in size.  5. Any leg numbness, aching, or discomfort (immediately).  6. Increasing discomfort in the leg at the insertion site.  7. Chest pains, even if " relieved by Nitroglycerin.    MISCELLANEOUS INSTRUCTIONS  1. Bruising may occur as a result of heart catheterization. Some of the discoloration may travel down the leg, going from blue to green in color.  2. A small round lump under the catheterization site will remain for up to six weeks.  3. If any questions arise call your physician's office. You can also call the HEALTH HOTLINE open 24 hours/day, 7 days/week and speak to a nurse at [phone], or toll free at [phone].   4. You should call 911 if you develop problems with breathing or chest pain.    FOR PROBLEMS CALL CARLOS MANUEL Agustin AT: [phone]    I acknowledge receipt and understanding of these Home Care instructions.

## 2018-06-28 ENCOUNTER — APPOINTMENT (OUTPATIENT)
Dept: SLEEP MEDICINE | Facility: MEDICAL CENTER | Age: 73
End: 2018-06-28
Attending: INTERNAL MEDICINE
Payer: MEDICARE

## 2018-07-12 DIAGNOSIS — G47.33 OSA (OBSTRUCTIVE SLEEP APNEA): ICD-10-CM

## 2018-07-12 NOTE — TELEPHONE ENCOUNTER
Patient is scheduled for a Sleep Study on . Her original RX for Ambien  before she could fill. She would like a new RX sent to the [facility] on [address].  Please advise.

## 2018-07-13 RX ORDER — ZOLPIDEM TARTRATE 5 MG/1
5 TABLET ORAL NIGHTLY PRN
Qty: 3 TAB | Refills: 0 | Status: SHIPPED
Start: 2018-07-13 | End: 2018-07-16

## 2018-07-13 NOTE — TELEPHONE ENCOUNTER
rx faxed to   [id number] - [place], [place] - [address]  [place] [address]  Phone: [phone] Fax: [phone]    Patient called and informed RX faxed to pharmacy

## 2018-07-14 ENCOUNTER — SLEEP STUDY (OUTPATIENT)
Dept: SLEEP MEDICINE | Facility: MEDICAL CENTER | Age: 73
End: 2018-07-14
Attending: INTERNAL MEDICINE
Payer: MEDICARE

## 2018-07-14 DIAGNOSIS — G47.33 OSA (OBSTRUCTIVE SLEEP APNEA): ICD-10-CM

## 2018-07-14 PROCEDURE — 95811 POLYSOM 6/>YRS CPAP 4/> PARM: CPT | Performed by: INTERNAL MEDICINE

## 2018-07-16 ENCOUNTER — OFFICE VISIT (OUTPATIENT)
Dept: CARDIOLOGY | Facility: MEDICAL CENTER | Age: 73
End: 2018-07-16
Payer: MEDICARE

## 2018-07-16 VITALS
HEART RATE: 72 BPM | WEIGHT: 140 LBS | OXYGEN SATURATION: 96 % | HEIGHT: 65 IN | DIASTOLIC BLOOD PRESSURE: 90 MMHG | BODY MASS INDEX: 23.32 KG/M2 | SYSTOLIC BLOOD PRESSURE: 140 MMHG

## 2018-07-16 DIAGNOSIS — I25.10 CORONARY ARTERY DISEASE INVOLVING NATIVE CORONARY ARTERY OF NATIVE HEART WITHOUT ANGINA PECTORIS: Chronic | ICD-10-CM

## 2018-07-16 DIAGNOSIS — E78.01 FAMILIAL HYPERCHOLESTEROLEMIA: Chronic | ICD-10-CM

## 2018-07-16 DIAGNOSIS — I72.4 PSEUDOANEURYSM OF RIGHT FEMORAL ARTERY (HCC): ICD-10-CM

## 2018-07-16 DIAGNOSIS — R06.83 SNORING: Chronic | ICD-10-CM

## 2018-07-16 DIAGNOSIS — I10 ESSENTIAL HYPERTENSION: Chronic | ICD-10-CM

## 2018-07-16 PROBLEM — R94.39 POSITIVE CARDIAC STRESS TEST: Status: RESOLVED | Noted: 2018-06-20 | Resolved: 2018-07-16

## 2018-07-16 PROCEDURE — 99214 OFFICE O/P EST MOD 30 MIN: CPT | Performed by: NURSE PRACTITIONER

## 2018-07-16 RX ORDER — AMLODIPINE BESYLATE 2.5 MG/1
2.5 TABLET ORAL DAILY
Qty: 30 TAB | Refills: 11 | Status: SHIPPED | OUTPATIENT
Start: 2018-07-16 | End: 2019-06-14 | Stop reason: SDUPTHER

## 2018-07-16 ASSESSMENT — ENCOUNTER SYMPTOMS
HEARTBURN: 1
COUGH: 1
MYALGIAS: 0
DIZZINESS: 0
SPUTUM PRODUCTION: 0
ORTHOPNEA: 0
SHORTNESS OF BREATH: 1
PND: 0
CLAUDICATION: 0
PALPITATIONS: 0
ABDOMINAL PAIN: 0
FEVER: 0
WEAKNESS: 0

## 2018-07-16 NOTE — LETTER
[facility]   [address]  [place] [address]  Phone: [phone]  Fax: [phone]              Funmi Veronica  1945    Encounter Date: 7/16/2018    LALI Lincoln          PROGRESS NOTE:  Chief Complaint   Patient presents with   • Coronary Artery Disease     Follow up     Subjective:   Funmi Veronica is a 72 y.o. female who presents today for follow up on labile BP and now S/P LHC.    She is a patient of Dr. Reinier Perez in our office.     Hx of CAD (patent RCA stents X2 with mod diseaese in LAD), HLD, HTN,BRISA (pending workup with pulm), daily cough and congestion, RA, gout, and depression.    Her BP log indicates moderately controlled HTN, now with systolic readings in 120-150's.    She has occasional chest pain, however LHC showed patent stents. We will focus on BP management and look for non-cardiac causes.    She is still smoking, we discussed the importance of smoking cessation. She will continue to work on this.    She is seeing pulmonologist soon and sleep study does indicate BRISA.    Past Medical History:   Diagnosis Date   • CAD (coronary artery disease) 3/12/2012    VLAD to RCA in '06 with complication of pseudoanuerysm in R fem site   • Depression 3/12/2012   • Hyperlipidemia 3/12/2012   • Hypertension    • Nicotine dependence 3/12/2012   • Palpitations    • Rheumatoid arthritis(714.0) 3/12/2012   • Snoring 3/12/2012     Past Surgical History:   Procedure Laterality Date   • CARDIAC CATH       Family History   Problem Relation Age of Onset   • Heart Disease Mother 60     MI   • Heart Disease Brother 38     MI     Social History     Social History   • Marital status:      Spouse name: N/A   • Number of children: N/A   • Years of education: N/A     Occupational History   • Not on file.     Social History Main Topics   • Smoking status: Current Every Day Smoker     Packs/day: 0.50     Years: 53.00     Types:  "Cigarettes   • Smokeless tobacco: Never Used   • Alcohol use Yes      Comment: occasionally   • Drug use: No   • Sexual activity: Not on file     Other Topics Concern   • Not on file     Social History Narrative   • No narrative on file     Allergies   Allergen Reactions   • Atorvastatin Rash     Rash     • Augmentin    • Gold Salts      Outpatient Encounter Prescriptions as of 7/16/2018   Medication Sig Dispense Refill   • amLODIPine (NORVASC) 2.5 MG Tab Take 1 Tab by mouth every day. 30 Tab 11   • metoprolol (LOPRESSOR) 25 MG Tab Take 0.5 Tabs by mouth 2 times a day. 60 Tab 11   • allopurinol (ZYLOPRIM) 100 MG Tab Take 100 mg by mouth 2 times a day.     • rosuvastatin (CRESTOR) 40 MG tablet Take 1 Tab by mouth every bedtime. 90 Tab 3   • nitroglycerin (NITROSTAT) 0.4 MG SL Tab Place 1 Tab under tongue as needed for Chest Pain. 25 Tab 11   • Hydrocodone-Acetaminophen (VICODIN PO) Take 10 mg by mouth.     • aspirin (ASA) 81 MG CHEW Take 81 mg by mouth every day.     • [DISCONTINUED] zolpidem (AMBIEN) 5 MG Tab Take 1 Tab by mouth at bedtime as needed for Sleep (Bring to Sleep Study) for up to 3 days. (Patient not taking: Reported on 7/16/2018) 3 Tab 0     No facility-administered encounter medications on file as of 7/16/2018.      Review of Systems   Constitutional: Negative for fever and malaise/fatigue.   Respiratory: Positive for cough and shortness of breath. Negative for sputum production.         Exertional and during sleep   Cardiovascular: Negative for chest pain, palpitations, orthopnea, claudication, leg swelling and PND.   Gastrointestinal: Positive for heartburn. Negative for abdominal pain.   Musculoskeletal: Negative for myalgias.   Neurological: Negative for dizziness and weakness. Tingling:    All other systems reviewed and are negative.       Objective:   /90   Pulse 72   Ht 1.651 m (5' 5\")   Wt 63.5 kg (140 lb)   SpO2 96%   BMI 23.30 kg/m²      Physical Exam   Constitutional: She is " oriented to person, place, and time. She appears well-developed and well-nourished.   HENT:   Head: Normocephalic and atraumatic.   Eyes: EOM are normal.   Neck: No JVD present.   Cardiovascular: Regular rhythm, normal heart sounds, intact distal pulses and normal pulses.  Bradycardia present.    Pulmonary/Chest: Effort normal. No respiratory distress. She has decreased breath sounds in the right lower field and the left lower field. She has no rhonchi.   Musculoskeletal: Normal range of motion. She exhibits no edema.   Neurological: She is alert and oriented to person, place, and time.   Skin: Skin is warm and dry.   Nursing note and vitals reviewed.      Assessment:     1. Coronary artery disease involving native coronary artery of native heart without angina pectoris     2. Essential hypertension     3. Familial hypercholesterolemia     4. Pseudoaneurysm of right femoral artery, S/P repair 2006     5. Snoring       Medical Decision Making:  Today's Assessment / Status / Plan:     1. CAD with prior VLAD X2 to RCA in '06, residual disease in LAD  -C showed patent stents to RCA with only moderate residual disease to LAD  -follow clinically with med management at this time  -cont asa, statin, bb  -chest pains atypical, follow up with PCP and manage HTN    2. HTN  -cont metoprolol 12.5 mg BID  -add amlodipine 2.5 mg QD, follow up with BP log in 1-2 weeks with phone call    3. HLD  -LDL at goal, cont crestor  -LDL goal <70 with CAD  -needs yearly CMP and lipid    4. Snoring, cough, congestion  -follow by pulm  -continues to smoke despite discussions of importance of cessation  -sleep study results pending    5. Leg cramps  -sounds more nocturnal  -improved with K correction    6. Palpitations with documented PAT on Holter  -improved palpitations  -cont metoprolol 12.5 mg BID    FU in clinic in 6 months with SC or IA    Patient verbalizes understanding and agrees with the plan of care.     Collaborating MD: Charley  MD        No Recipients

## 2018-07-16 NOTE — PROGRESS NOTES
Chief Complaint   Patient presents with   • Coronary Artery Disease     Follow up     Subjective:   Funmi Veronica is a 72 y.o. female who presents today for follow up on labile BP and now S/P LHC.    She is a patient of Dr. Reinier Perez in our office.     Hx of CAD (patent RCA stents X2 with mod diseaese in LAD), HLD, HTN,BRISA (pending workup with pulm), daily cough and congestion, RA, gout, and depression.    Her BP log indicates moderately controlled HTN, now with systolic readings in 120-150's.    She has occasional chest pain, however LHC showed patent stents. We will focus on BP management and look for non-cardiac causes.    She is still smoking, we discussed the importance of smoking cessation. She will continue to work on this.    She is seeing pulmonologist soon and sleep study does indicate BRISA.    Past Medical History:   Diagnosis Date   • CAD (coronary artery disease) 3/12/2012    VLAD to RCA in '06 with complication of pseudoanuerysm in R fem site   • Depression 3/12/2012   • Hyperlipidemia 3/12/2012   • Hypertension    • Nicotine dependence 3/12/2012   • Palpitations    • Rheumatoid arthritis(714.0) 3/12/2012   • Snoring 3/12/2012     Past Surgical History:   Procedure Laterality Date   • CARDIAC CATH       Family History   Problem Relation Age of Onset   • Heart Disease Mother 60     MI   • Heart Disease Brother 38     MI     Social History     Social History   • Marital status:      Spouse name: N/A   • Number of children: N/A   • Years of education: N/A     Occupational History   • Not on file.     Social History Main Topics   • Smoking status: Current Every Day Smoker     Packs/day: 0.50     Years: 53.00     Types: Cigarettes   • Smokeless tobacco: Never Used   • Alcohol use Yes      Comment: occasionally   • Drug use: No   • Sexual activity: Not on file     Other Topics Concern   • Not on file     Social History Narrative   • No narrative on file     Allergies   Allergen Reactions   •  "Atorvastatin Rash     Rash     • Augmentin    • Gold Salts      Outpatient Encounter Prescriptions as of 7/16/2018   Medication Sig Dispense Refill   • amLODIPine (NORVASC) 2.5 MG Tab Take 1 Tab by mouth every day. 30 Tab 11   • metoprolol (LOPRESSOR) 25 MG Tab Take 0.5 Tabs by mouth 2 times a day. 60 Tab 11   • allopurinol (ZYLOPRIM) 100 MG Tab Take 100 mg by mouth 2 times a day.     • rosuvastatin (CRESTOR) 40 MG tablet Take 1 Tab by mouth every bedtime. 90 Tab 3   • nitroglycerin (NITROSTAT) 0.4 MG SL Tab Place 1 Tab under tongue as needed for Chest Pain. 25 Tab 11   • Hydrocodone-Acetaminophen (VICODIN PO) Take 10 mg by mouth.     • aspirin (ASA) 81 MG CHEW Take 81 mg by mouth every day.     • [DISCONTINUED] zolpidem (AMBIEN) 5 MG Tab Take 1 Tab by mouth at bedtime as needed for Sleep (Bring to Sleep Study) for up to 3 days. (Patient not taking: Reported on 7/16/2018) 3 Tab 0     No facility-administered encounter medications on file as of 7/16/2018.      Review of Systems   Constitutional: Negative for fever and malaise/fatigue.   Respiratory: Positive for cough and shortness of breath. Negative for sputum production.         Exertional and during sleep   Cardiovascular: Negative for chest pain, palpitations, orthopnea, claudication, leg swelling and PND.   Gastrointestinal: Positive for heartburn. Negative for abdominal pain.   Musculoskeletal: Negative for myalgias.   Neurological: Negative for dizziness and weakness. Tingling:    All other systems reviewed and are negative.       Objective:   /90   Pulse 72   Ht 1.651 m (5' 5\")   Wt 63.5 kg (140 lb)   SpO2 96%   BMI 23.30 kg/m²     Physical Exam   Constitutional: She is oriented to person, place, and time. She appears well-developed and well-nourished.   HENT:   Head: Normocephalic and atraumatic.   Eyes: EOM are normal.   Neck: No JVD present.   Cardiovascular: Regular rhythm, normal heart sounds, intact distal pulses and normal pulses.  " Bradycardia present.    Pulmonary/Chest: Effort normal. No respiratory distress. She has decreased breath sounds in the right lower field and the left lower field. She has no rhonchi.   Musculoskeletal: Normal range of motion. She exhibits no edema.   Neurological: She is alert and oriented to person, place, and time.   Skin: Skin is warm and dry.   Nursing note and vitals reviewed.      Assessment:     1. Coronary artery disease involving native coronary artery of native heart without angina pectoris     2. Essential hypertension     3. Familial hypercholesterolemia     4. Pseudoaneurysm of right femoral artery, S/P repair 2006     5. Snoring       Medical Decision Making:  Today's Assessment / Status / Plan:     1. CAD with prior VLAD X2 to RCA in '06, residual disease in LAD  -[facility] showed patent stents to RCA with only moderate residual disease to LAD  -follow clinically with med management at this time  -cont asa, statin, bb  -chest pains atypical, follow up with PCP and manage HTN    2. HTN  -cont metoprolol 12.5 mg BID  -add amlodipine 2.5 mg QD, follow up with BP log in 1-2 weeks with phone call    3. HLD  -LDL at goal, cont crestor  -LDL goal <70 with CAD  -needs yearly CMP and lipid    4. Snoring, cough, congestion  -follow by pulm  -continues to smoke despite discussions of importance of cessation  -sleep study results pending    5. Leg cramps  -sounds more nocturnal  -improved with K correction    6. Palpitations with documented PAT on Holter  -improved palpitations  -cont metoprolol 12.5 mg BID    FU in clinic in 6 months with SC or IA    Patient verbalizes understanding and agrees with the plan of care.     Collaborating MD: Charley JUÁREZ

## 2018-07-16 NOTE — PROCEDURES
The patient underwent a split night polysomnogram with a CPAP titration using the standard montage for measurement of paramaters of sleep, respiratory events, movement abnormalities, heart rate and rhythm.  A Microphone was used to monitior snoring.  Interpretation:  Study start time was 10:39:26 PM.  Total recording time was 3h 28.5m (208 minutes) with a total sleep time of 2h 6.0m (126 minutes) resulting in a sleep efficiency of 60.43%.  Sleep latency from the start fo the study was 61 minutes minutes and REM latency from sleep onset was 44 minutes minutes.  Respiratory:   There were 13 apneas in total consisting of 13 obstructive apneas, 0 mixed apneas, and 0 central apneas.  There were 59 hypopneas in total.  The apnea index was 6.19 per hour and the hypopnea index was 28.10 per hour.  The overall AHI was 34.3, with a REM AHI of 0.00, and a supine AHI of 115.56.  Limb Movements:  There were a total of 132 periodic leg movements, of which 13 were PLMS arousals.  This resulted in a PLMS index of 62.9 and a PLMS arousal index of 6.2  Oximetry:  The mean SaO2 was 92.0% for the diagnostic portion of the study, with a minimum SaO2 of 83.0%.    Treatment:  Interpretation:  Treatment recording time was 3h 46.0m (226 minutes) with a total sleep time of 3h 24.0m (204 minutes) resulting in a sleep efficiency of 90.3%.    Sleep latency from the start of treatment was 02 minutes minutes and REM latency from sleep onset was 0h 25.5m minutes.    The patient had 91 arousals in total for an arousal index of 26.8.  Respiratory:   There were 37 apneas in total consisting of  0 obstructive apneas, 37 central apneas, and 0 mixed apneas for an apnea index of 10.88.    The patient had 19 hypopneas in total, which resulted in a hypopnea index of 5.59.    The overall AHI was 16.47, with a REM AHI of 0.00, and a supine AHI of 44.44.     Limb Movements:  There were a total of 340 periodic leg movements, of which 75 were PLMS arousals.   This resulted in a PLMS index of 100.0 and a PLMS arousal index of 22.1.  Oximetry:  The mean SaO2 during treatment was 94.0%, with a minimum oxygen saturation of 89.0%.  CPAP was tried from 5cm to 11cm H2O.     RECORDING TECHNIQUE:       After the scalp was prepared, gold plated electrodes were applied to the scalp according to the International 10-20 System. EEG (electroencephalogram) was continuously monitored from the O1-M2, O2-M1, C3-M2, C4-M1, F3-M2, and F4-M1.   EOGs (electrooculograms) were monitored by electrodes placed at the left and right outer canthi.  Chin EMG (electromyogram) was monitored by electrodes placed on the mentalis and sub-mentalis muscles.  Nasal and oral airflow were monitored using a triple port thermocouple as well as oronasal pressure transducer.  Respiratory effort was measured by inductive plethysmography technology employing abdominal and thoracic belts.  Blood oxygen saturation and pulse were monitored by pulse oximetry.  Heart rhythm was monitored by surface electrocardiogram.  Leg EMG was studied using surface electrodes placed on left and right anterior tibialis.  A microphone was used to monitor tracheal sounds and snoring.  Body position was monitored and documented by technician observation      SUMMARY:    This was an overnight diagnostic polysomnogram with a subsequent positive airway pressure titration.  The patient chose to use a small Respironics Dreamwear mask with heated humidification.    During the diagnostic phase the total recording time was 208 minutes, the sleep period time was 147 minutes, and the total sleep time was 126 minutes.  The patient's sleep efficiency was 60.43 % which is significantly reduced..  The patient experienced 1 REM period.    The sleep stage durations revealed 21 minutes of wake after sleep onset (WASO), 39 minutes of N1 sleep, 59 minutes of N2 sleep, 15 minutes of N3 sleep, and 12 minutes of REM.    The latency to sleep was 61 minutes  which is significantly prolonged.  The latency to REM was 44 minutes which is reduced.  Severe sleep fragmentation occurred.  The arousal index was 40.  The Total Limb Movement Index was 4.8, the Limb Movement with Arousal Index was 9.0, and the PLM Series Index was 57.1.    The patient experienced 72 apneas and hypopneas and 1 RERAS.  The apnea hypopnea index was 34.3, the RDI was 34.8, the mean event duration was 38.5 seconds, and the longest event lasted 108.9 seconds.  The REM index was 0.0 and the supine index was 26.0.  The apnea hypopnea index represents severe sleep apnea syndrome.    The reyna saturation during sleep was 89 % and the patient spent 7.9 % of the recording with saturations less than or equal to 90%.    During sleep, the minimum heart rate was 49 bpm, the mean heart rate was 52 bpm, and the maximum heart rate was 82 bpm.    Once the patient met the split-night protocol, the technician performed a positive airway pressure titration.  The technician initiated treatment with CPAP set at 5 cmH2O and increased the pressure to a maximum of 11 cmH2O.    The patient did best on CPAP at 9 cm H2O with a resultant AHI of 1.47, a minimum saturation of 91 %, and a mean saturation of 93 %.    ASSESSMENT:  Severe obstructive sleep apnea hypopnea - AHI 34.3  Mild nocturnal desaturation - reyna saturation 89 % - saturations <90% for 7.9% of the diagnostic recording   Severe sleep fragmentation  Successful CPAP titration to a final pressure of 9 cmH2O with the achievement of REM though not supine sleep and a resultant AHI of 1.47, a minimum saturation of 91.0%, and a mean saturation 93.0%   Treatment emergent central apnea.  During the treatment phase central apneas comprised 66.1% of the 56 apneas and hypopneas.      RECOMMENDATION:    Recommend CPAP at 9 cmH2O using a mask of choice and heated humidification followed by data card and clinical review in 6-8 weeks.  Alternatively auto titrating CPAP 9-15 cm H2O  is an option.  Should the patient fare poorly with CPAP, consider returning for an ASV titration.

## 2018-08-17 ENCOUNTER — SLEEP CENTER VISIT (OUTPATIENT)
Dept: SLEEP MEDICINE | Facility: MEDICAL CENTER | Age: 73
End: 2018-08-17
Payer: MEDICARE

## 2018-08-17 VITALS
DIASTOLIC BLOOD PRESSURE: 72 MMHG | WEIGHT: 141 LBS | RESPIRATION RATE: 16 BRPM | HEART RATE: 70 BPM | SYSTOLIC BLOOD PRESSURE: 124 MMHG | HEIGHT: 65 IN | BODY MASS INDEX: 23.49 KG/M2 | OXYGEN SATURATION: 94 %

## 2018-08-17 DIAGNOSIS — Z72.0 TOBACCO USE: ICD-10-CM

## 2018-08-17 DIAGNOSIS — G47.33 OSA (OBSTRUCTIVE SLEEP APNEA): ICD-10-CM

## 2018-08-17 PROCEDURE — 99213 OFFICE O/P EST LOW 20 MIN: CPT | Performed by: INTERNAL MEDICINE

## 2018-08-17 NOTE — PROGRESS NOTES
"Chief Complaint   Patient presents with   • Results     sleep study results     HPI: This patient is a 72 y.o. female who returns for sleep study results.  She has a history of coronary artery disease and labile hypertension, and has been told that she \"snores and stops breathing\" for the past 4 years.  She admits she wakes herself up from her snoring.  Her [place] sleepiness score is 9.  She is an active smoker, has been weaning her cigarette use.  Her BMI is 26.   Polysomnography in the sleep laboratory showed severe BRISA with AHI 34 events per hour associated with desaturation to 89%.  She was successfully titrated on CPAP: 9 cm of water.  She felt more rested on the morning following her sleep study and is amenable to CPAP use.      Past Medical History:   Diagnosis Date   • CAD (coronary artery disease) 3/12/2012    VLAD to RCA in '06 with complication of pseudoanuerysm in R fem site   • Depression 3/12/2012   • Hyperlipidemia 3/12/2012   • Hypertension    • Nicotine dependence 3/12/2012   • Palpitations    • Rheumatoid arthritis(714.0) 3/12/2012   • Snoring 3/12/2012       Social History     Social History   • Marital status:      Spouse name: N/A   • Number of children: N/A   • Years of education: N/A     Occupational History   • Not on file.     Social History Main Topics   • Smoking status: Current Every Day Smoker     Packs/day: 0.50     Years: 53.00     Types: Cigarettes   • Smokeless tobacco: Never Used   • Alcohol use Yes      Comment: occasionally   • Drug use: No   • Sexual activity: Not on file     Other Topics Concern   • Not on file     Social History Narrative   • No narrative on file       Family History   Problem Relation Age of Onset   • Heart Disease Mother 60        MI   • Heart Disease Brother 38        MI       Current Outpatient Prescriptions on File Prior to Visit   Medication Sig Dispense Refill   • amLODIPine (NORVASC) 2.5 MG Tab Take 1 Tab by mouth every day. 30 Tab 11   • " "metoprolol (LOPRESSOR) 25 MG Tab Take 0.5 Tabs by mouth 2 times a day. 60 Tab 11   • allopurinol (ZYLOPRIM) 100 MG Tab Take 100 mg by mouth 2 times a day.     • rosuvastatin (CRESTOR) 40 MG tablet Take 1 Tab by mouth every bedtime. 90 Tab 3   • aspirin (ASA) 81 MG CHEW Take 81 mg by mouth every day.     • nitroglycerin (NITROSTAT) 0.4 MG SL Tab Place 1 Tab under tongue as needed for Chest Pain. 25 Tab 11   • Hydrocodone-Acetaminophen (VICODIN PO) Take 10 mg by mouth.       No current facility-administered medications on file prior to visit.        Allergies: Atorvastatin; Augmentin; and Gold salts    ROS:   Constitutional: Denies fevers, chills, night sweats, fatigue or weight loss  Eyes: Denies vision loss, pain, drainage, double vision  Ears, Nose, Throat: Denies earache, difficulty hearing, tinnitus, nasal congestion, hoarseness  Cardiovascular: Denies chest pain, tightness, palpitations, orthopnea or edema  Respiratory: Denies shortness of breath, cough, wheezing, hemoptysis  Sleep: As in HPI  GI: Denies heartburn, dysphagia, nausea, abdominal pain, diarrhea or constipation  : Denies frequent urination, hematuria, discharge or painful urination  Musculoskeletal: Denies back pain, painful joints, sore muscles  Neurological: Denies weakness or headaches  Skin: No rashes    Blood pressure 124/72, pulse 70, resp. rate 16, height 1.651 m (5' 5\"), weight 64 kg (141 lb), SpO2 94 %.    Physical Exam:  Appearance: Well-nourished, well-developed, in no acute distress  HEENT: Normocephalic, atraumatic, white sclera, PERRLA, Mallampati 3  Neck: No adenopathy or masses  Respiratory: no intercostal retractions or accessory muscle use  Lungs auscultation: Clear to auscultation bilaterally  Cardiovascular: Regular rate rhythm. No murmurs, rubs or gallops.  No LE edema  Abdomen: soft, nondistended  Gait: Normal  Digits: No clubbing, cyanosis  Motor: No focal deficits  Orientation: Oriented to time, person and " place    Diagnosis:  1. BRISA (obstructive sleep apnea)  DME CPAP   2. Tobacco use         Plan:  The patient has severe BRISA warranting CPAP therapy.  She will be set up on CPAP: 9 cm of water using nasal pillows.  Download CPAP compliance card after at least 8 weeks on CPAP to monitor response to treatment.  Smoking cessation counseling provided.  Return in about 3 months (around 11/17/2018).

## 2018-11-19 ENCOUNTER — HOSPITAL ENCOUNTER (EMERGENCY)
Dept: HOSPITAL 8 - ED | Age: 73
Discharge: HOME | End: 2018-11-19
Payer: MEDICARE

## 2018-11-19 VITALS — BODY MASS INDEX: 23.14 KG/M2 | WEIGHT: 138.89 LBS | HEIGHT: 65 IN

## 2018-11-19 VITALS — DIASTOLIC BLOOD PRESSURE: 48 MMHG | SYSTOLIC BLOOD PRESSURE: 108 MMHG

## 2018-11-19 DIAGNOSIS — J44.1: ICD-10-CM

## 2018-11-19 DIAGNOSIS — J20.9: Primary | ICD-10-CM

## 2018-11-19 DIAGNOSIS — N28.9: ICD-10-CM

## 2018-11-19 DIAGNOSIS — F17.200: ICD-10-CM

## 2018-11-19 DIAGNOSIS — J44.0: ICD-10-CM

## 2018-11-19 DIAGNOSIS — I10: ICD-10-CM

## 2018-11-19 LAB
ALBUMIN SERPL-MCNC: 3.7 G/DL (ref 3.4–5)
ANION GAP SERPL CALC-SCNC: 6 MMOL/L (ref 5–15)
BASOPHILS # BLD AUTO: 0.09 X10^3/UL (ref 0–0.1)
BASOPHILS NFR BLD AUTO: 1 % (ref 0–1)
CALCIUM SERPL-MCNC: 9.4 MG/DL (ref 8.5–10.1)
CHLORIDE SERPL-SCNC: 109 MMOL/L (ref 98–107)
CREAT SERPL-MCNC: 1.32 MG/DL (ref 0.55–1.02)
CULTURE INDICATED?: YES
EOSINOPHIL # BLD AUTO: 0.38 X10^3/UL (ref 0–0.4)
EOSINOPHIL NFR BLD AUTO: 4 % (ref 1–7)
ERYTHROCYTE [DISTWIDTH] IN BLOOD BY AUTOMATED COUNT: 15.5 % (ref 9.6–15.2)
LYMPHOCYTES # BLD AUTO: 2.13 X10^3/UL (ref 1–3.4)
LYMPHOCYTES NFR BLD AUTO: 23 % (ref 22–44)
MCH RBC QN AUTO: 30.6 PG (ref 27–34.8)
MCHC RBC AUTO-ENTMCNC: 33 G/DL (ref 32.4–35.8)
MCV RBC AUTO: 92.7 FL (ref 80–100)
MD: NO
MICROSCOPIC: (no result)
MONOCYTES # BLD AUTO: 0.75 X10^3/UL (ref 0.2–0.8)
MONOCYTES NFR BLD AUTO: 8 % (ref 2–9)
NEUTROPHILS # BLD AUTO: 5.85 X10^3/UL (ref 1.8–6.8)
NEUTROPHILS NFR BLD AUTO: 64 % (ref 42–75)
PLATELET # BLD AUTO: 223 X10^3/UL (ref 130–400)
PMV BLD AUTO: 9.1 FL (ref 7.4–10.4)
RBC # BLD AUTO: 4.38 X10^6/UL (ref 3.82–5.3)

## 2018-11-19 PROCEDURE — 87086 URINE CULTURE/COLONY COUNT: CPT

## 2018-11-19 PROCEDURE — 71046 X-RAY EXAM CHEST 2 VIEWS: CPT

## 2018-11-19 PROCEDURE — 36415 COLL VENOUS BLD VENIPUNCTURE: CPT

## 2018-11-19 PROCEDURE — 82040 ASSAY OF SERUM ALBUMIN: CPT

## 2018-11-19 PROCEDURE — 87040 BLOOD CULTURE FOR BACTERIA: CPT

## 2018-11-19 PROCEDURE — 94640 AIRWAY INHALATION TREATMENT: CPT

## 2018-11-19 PROCEDURE — 85025 COMPLETE CBC W/AUTO DIFF WBC: CPT

## 2018-11-19 PROCEDURE — 93005 ELECTROCARDIOGRAM TRACING: CPT

## 2018-11-19 PROCEDURE — 83880 ASSAY OF NATRIURETIC PEPTIDE: CPT

## 2018-11-19 PROCEDURE — 81001 URINALYSIS AUTO W/SCOPE: CPT

## 2018-11-19 PROCEDURE — 99284 EMERGENCY DEPT VISIT MOD MDM: CPT

## 2018-11-19 PROCEDURE — 80048 BASIC METABOLIC PNL TOTAL CA: CPT

## 2018-11-19 PROCEDURE — 83605 ASSAY OF LACTIC ACID: CPT

## 2018-11-21 ENCOUNTER — SLEEP CENTER VISIT (OUTPATIENT)
Dept: SLEEP MEDICINE | Facility: MEDICAL CENTER | Age: 73
End: 2018-11-21
Payer: MEDICARE

## 2018-11-21 VITALS
HEIGHT: 65 IN | WEIGHT: 146 LBS | HEART RATE: 54 BPM | DIASTOLIC BLOOD PRESSURE: 72 MMHG | OXYGEN SATURATION: 95 % | SYSTOLIC BLOOD PRESSURE: 118 MMHG | RESPIRATION RATE: 16 BRPM | BODY MASS INDEX: 24.32 KG/M2

## 2018-11-21 DIAGNOSIS — Z72.0 TOBACCO USE: ICD-10-CM

## 2018-11-21 DIAGNOSIS — G47.33 OSA (OBSTRUCTIVE SLEEP APNEA): ICD-10-CM

## 2018-11-21 DIAGNOSIS — J40 BRONCHITIS: ICD-10-CM

## 2018-11-21 PROCEDURE — 99214 OFFICE O/P EST MOD 30 MIN: CPT | Performed by: INTERNAL MEDICINE

## 2018-11-21 RX ORDER — AZITHROMYCIN 250 MG/1
TABLET, FILM COATED ORAL
COMMUNITY
Start: 2018-11-19 | End: 2019-02-01

## 2018-11-21 RX ORDER — BUDESONIDE AND FORMOTEROL FUMARATE DIHYDRATE 160; 4.5 UG/1; UG/1
2 AEROSOL RESPIRATORY (INHALATION) 2 TIMES DAILY
Qty: 1 INHALER | Refills: 0 | Status: SHIPPED | OUTPATIENT
Start: 2018-11-21

## 2018-11-21 RX ORDER — PREDNISONE 20 MG/1
TABLET ORAL
COMMUNITY
Start: 2018-11-19 | End: 2019-02-01

## 2018-11-21 NOTE — PROGRESS NOTES
"Chief Complaint   Patient presents with   • Follow-Up     BRISA, first compliance check     HPI: This patient is a 73 y.o. female who returns for first CPAP compliance check.  She has a history of coronary artery disease and labile hypertension, and has been told that she \"snores and stops breathing\" for the past 4 years.  She admits she wakes herself up from her snoring.  Her [place] sleepiness score is 9.  Her BMI is 26.   Polysomnography in the sleep laboratory showed severe BRISA with AHI 34 events per hour associated with desaturation to 89%.  She was successfully titrated on CPAP: 9 cm of water.   Compliance card shows 53% usage over the past month for average 5.5 hours nightly; her average AHI is 2.2.  She has had reduced usage secondary to recent acute bronchitis.  She was recently seen at [facility] and prescribed Z-Damaso and steroids as well as Combivent inhaler.  Unfortunately Combivent was prohibitively expensive for her.  She continues to smoke however has tapered down to 4 cigarettes daily.  She continues to cough, with nasal and chest congestion, making it difficult to use CPAP.    Past Medical History:   Diagnosis Date   • CAD (coronary artery disease) 3/12/2012    VLAD to RCA in '06 with complication of pseudoanuerysm in R fem site   • Depression 3/12/2012   • Hyperlipidemia 3/12/2012   • Hypertension    • Nicotine dependence 3/12/2012   • Palpitations    • Rheumatoid arthritis(714.0) 3/12/2012   • Snoring 3/12/2012       Social History     Social History   • Marital status:      Spouse name: N/A   • Number of children: N/A   • Years of education: N/A     Occupational History   • Not on file.     Social History Main Topics   • Smoking status: Current Every Day Smoker     Packs/day: 0.50     Years: 53.00     Types: Cigarettes   • Smokeless tobacco: Never Used   • Alcohol use Yes      Comment: occasionally   • Drug use: No   • Sexual activity: Not on file     Other Topics Concern   • Not on file " "    Social History Narrative   • No narrative on file       Family History   Problem Relation Age of Onset   • Heart Disease Mother 60        MI   • Heart Disease Brother 38        MI       Current Outpatient Prescriptions on File Prior to Visit   Medication Sig Dispense Refill   • amLODIPine (NORVASC) 2.5 MG Tab Take 1 Tab by mouth every day. 30 Tab 11   • metoprolol (LOPRESSOR) 25 MG Tab Take 0.5 Tabs by mouth 2 times a day. 60 Tab 11   • allopurinol (ZYLOPRIM) 100 MG Tab Take 100 mg by mouth 2 times a day.     • rosuvastatin (CRESTOR) 40 MG tablet Take 1 Tab by mouth every bedtime. 90 Tab 3   • aspirin (ASA) 81 MG CHEW Take 81 mg by mouth every day.     • nitroglycerin (NITROSTAT) 0.4 MG SL Tab Place 1 Tab under tongue as needed for Chest Pain. 25 Tab 11   • Hydrocodone-Acetaminophen (VICODIN PO) Take 10 mg by mouth.       No current facility-administered medications on file prior to visit.        Allergies: Atorvastatin; Augmentin; and Gold salts    ROS:   Constitutional: Denies fevers, chills, night sweats, fatigue or weight loss  Eyes: Denies vision loss, pain, drainage, double vision  Ears, Nose, Throat: Denies earache, difficulty hearing, tinnitus, nasal congestion, hoarseness  Cardiovascular: Denies chest pain, tightness, palpitations, orthopnea or edema  Respiratory: +shortness of breath, cough, wheezing, denies hemoptysis  Sleep: As in HPI  GI: Denies heartburn, dysphagia, nausea, abdominal pain, diarrhea or constipation  : Denies frequent urination, hematuria, discharge or painful urination  Musculoskeletal: Denies back pain, painful joints, sore muscles  Neurological: Denies weakness or headaches  Skin: No rashes    Blood pressure 118/72, pulse (!) 54, resp. rate 16, height 1.651 m (5' 5\"), weight 66.2 kg (146 lb), SpO2 95 %.    Physical Exam:  Appearance: Well-nourished, well-developed, in no acute distress  HEENT: Normocephalic, atraumatic, white sclera, PERRLA, oropharynx clear  Neck: No adenopathy " or masses  Respiratory: no intercostal retractions or accessory muscle use  Lungs auscultation: Diminished breath sounds, no rhonchi or wheezing  Cardiovascular: Regular rate rhythm. No murmurs, rubs or gallops.  No LE edema  Abdomen: soft, nondistended  Gait: Normal  Digits: No clubbing, cyanosis  Motor: No focal deficits  Orientation: Oriented to time, person and place    Diagnosis:  1. BRISA (obstructive sleep apnea)     2. Bronchitis  budesonide-formoterol (SYMBICORT) 160-4.5 MCG/ACT Aerosol   3. Tobacco use         Plan:  The patient shows reduced CPAP compliance secondary to recent acute bronchitis.  Concur with Z-Damaso with oral steroids.  Smoking cessation encouraged.  We will obtain her samples of Symbicort 160 mcg to use at 2 puffs twice daily for the following month.  Once able she is instructed to resume CPAP therapy and we will download compliance card in another 8 weeks.  Return in about 8 weeks (around 1/16/2019).

## 2019-02-01 ENCOUNTER — OFFICE VISIT (OUTPATIENT)
Dept: CARDIOLOGY | Facility: MEDICAL CENTER | Age: 74
End: 2019-02-01
Payer: MEDICARE

## 2019-02-01 VITALS
WEIGHT: 135 LBS | DIASTOLIC BLOOD PRESSURE: 72 MMHG | HEIGHT: 66 IN | OXYGEN SATURATION: 95 % | BODY MASS INDEX: 21.69 KG/M2 | SYSTOLIC BLOOD PRESSURE: 116 MMHG | HEART RATE: 64 BPM

## 2019-02-01 DIAGNOSIS — I72.4 PSEUDOANEURYSM OF RIGHT FEMORAL ARTERY (HCC): ICD-10-CM

## 2019-02-01 DIAGNOSIS — F17.200 SMOKER: ICD-10-CM

## 2019-02-01 DIAGNOSIS — I25.118 CORONARY ARTERY DISEASE OF NATIVE ARTERY OF NATIVE HEART WITH STABLE ANGINA PECTORIS (HCC): Primary | ICD-10-CM

## 2019-02-01 DIAGNOSIS — Z95.5 S/P CORONARY ARTERY STENT PLACEMENT: ICD-10-CM

## 2019-02-01 DIAGNOSIS — I10 ESSENTIAL HYPERTENSION: ICD-10-CM

## 2019-02-01 DIAGNOSIS — E78.5 DYSLIPIDEMIA: ICD-10-CM

## 2019-02-01 PROCEDURE — 99214 OFFICE O/P EST MOD 30 MIN: CPT | Performed by: INTERNAL MEDICINE

## 2019-02-01 RX ORDER — HYDROCODONE BITARTRATE AND ACETAMINOPHEN 10; 325 MG/1; MG/1
TABLET ORAL
COMMUNITY
Start: 2019-01-26 | End: 2019-02-01

## 2019-02-01 ASSESSMENT — ENCOUNTER SYMPTOMS
BRUISES/BLEEDS EASILY: 1
CARDIOVASCULAR NEGATIVE: 1

## 2019-02-01 NOTE — PATIENT INSTRUCTIONS
"For more information about diet and heart disease, I would recommend that you read    \"The Spectrum\" by Kaveh Daily MD          We can discuss your reaction to his advice at our next follow-up.  I will preface to say that I do disagree with his stance on Olive Oil, which is part of a Mediterranean diet.  A Mediterranean diet has been associated with less heart attacks by studies (Primary Prevention of Cardiovascular Disease with a Mediterranean Diet, [place] Journal of Medicine 2013; 368:1006-0058).    For more information on the therapeutic benefits of exercise, consider watching the [employer] video, called \"23 and 1/2 hours: What is the single best thing we can do for our health?\":    https://www.TripleTree[employer].com/watch?v=mJlDwR6BLNb          Reinier Perez MD  Cardiologist, [facility]   "

## 2019-02-01 NOTE — LETTER
Name:          Funmi Veronica   YOB: 1945  Date:     02/01/2019      Pankaj España M.D.  [address]  [facility] [address]     Reinier Perez MD  [address], [address]  [place] [address]  Phone: [phone]  Back Line: [phone]  Fax: [phone]  E-mail: [email or web address]   Dear Dr. España,    We had the pleasure of seeing your patient, Funmi Veronica, in Cardiology Clinic at [facility] today.    As you know, she is a 73-year-old woman with a history of coronary artery disease status post remote PCI to her RCA in 2006, and a 50-60% blockage in her mid LAD by repeat cardiac catheterization 6/26/2018, hypertension, dyslipidemia and nicotine dependence.    I discussed with her today the lesion in her LAD in conjunction with her symptoms of chest discomfort.  I do rather suspect that the lesion is high-grade though in the setting of stable symptoms, and normal left ventricular ejection fraction I have not pushed for intervention at this time.  I did emphasize with her the importance of smoking cessation, and adherence to her cardiovascular regimen.  Her blood pressure is well controlled on amlodipine and metoprolol tartrate both of which are helping with angina.  Her lipids are well controlled on Crestor, and she continues aspirin.    Return in about 6 months (around 8/1/2019).    Thank you for the referral and please do not hesitate to contact me at any time. My contact information is listed above.    This note was dictated using Dragon speech recognition software.     A full note including my physical examination and a full list of rectified medications is available in our medical record, and can be faxed as well.    Reinier Perez MD  Cardiologist  [facility]

## 2019-02-02 NOTE — PROGRESS NOTES
Subjective:   Funmi Veronica is a 73 -year-old woman with a history of coronary artery disease status post remote PCI to her RCA in 2006, and a 50-60% blockage in her mid LAD by repeat cardiac catheterization 6/26/2018, hypertension, dyslipidemia and nicotine dependence.    She tells me today about chest discomfort that she tells me is nearly always they are.  She can identify no specific palliating or provoking factors.  She does tell me that it is mostly nonexertional and describes walking up 3 flights of stairs to her apartment and walking her small dogs around her apartment building without any provocation of her symptoms.    She did have myocardial perfusion imaging that showed an anterior defect and a 50-60% lesion in her mid LAD on cardiac catheterization in June 2018.  That lesion was neither intervened upon nor was an FFR wire used to interrogate it.    She tells me she had an episode of bronchitis treated at [place] a couple of months ago.    Otherwise, she has no cardiovascular complaints no side effects with her medical regimen.  She does unfortunately continue to smoke 1/2 pack/day though she has cut down.    Past Medical History:   Diagnosis Date   • CAD (coronary artery disease) 3/12/2012    VLAD to RCA in '06 with complication of pseudoanuerysm in R fem site   • Depression 3/12/2012   • Hyperlipidemia 3/12/2012   • Hypertension    • Nicotine dependence 3/12/2012   • Palpitations    • Rheumatoid arthritis(714.0) 3/12/2012   • Snoring 3/12/2012     Past Surgical History:   Procedure Laterality Date   • CARDIAC CATH       Family History   Problem Relation Age of Onset   • Heart Disease Mother 60        MI   • Heart Disease Brother 38        MI     History   Smoking Status   • Current Every Day Smoker   • Packs/day: 0.50   • Years: 53.00   • Types: Cigarettes   Smokeless Tobacco   • Never Used     Allergies   Allergen Reactions   • Atorvastatin Rash     Rash     • Augmentin    • Gold Salts   "    Outpatient Encounter Prescriptions as of 2/1/2019   Medication Sig Dispense Refill   • budesonide-formoterol (SYMBICORT) 160-4.5 MCG/ACT Aerosol Inhale 2 Puffs by mouth 2 Times a Day. Use spacer. Rinse mouth after each use. 1 Inhaler 0   • amLODIPine (NORVASC) 2.5 MG Tab Take 1 Tab by mouth every day. 30 Tab 11   • metoprolol (LOPRESSOR) 25 MG Tab Take 0.5 Tabs by mouth 2 times a day. 60 Tab 11   • allopurinol (ZYLOPRIM) 100 MG Tab Take 100 mg by mouth 2 times a day.     • rosuvastatin (CRESTOR) 40 MG tablet Take 1 Tab by mouth every bedtime. 90 Tab 3   • nitroglycerin (NITROSTAT) 0.4 MG SL Tab Place 1 Tab under tongue as needed for Chest Pain. 25 Tab 11   • Hydrocodone-Acetaminophen (VICODIN PO) Take 10 mg by mouth.     • aspirin (ASA) 81 MG CHEW Take 81 mg by mouth every day.     • [DISCONTINUED] HYDROcodone/acetaminophen (NORCO)  MG Tab      • [DISCONTINUED] predniSONE (DELTASONE) 20 MG Tab      • [DISCONTINUED] azithromycin (ZITHROMAX) 250 MG Tab      • [DISCONTINUED] ipratropium-albuterol (COMBIVENT RESPIMAT)  MCG/ACT Aero Soln Inhale 1 Puff by mouth 4 times a day.       No facility-administered encounter medications on file as of 2/1/2019.      Review of Systems   Cardiovascular: Negative.    Gastrointestinal:        Dysphagia with a recent barium swallow   Endo/Heme/Allergies: Bruises/bleeds easily.   All other systems reviewed and are negative.       Objective:   /72 (BP Location: Right arm, Patient Position: Sitting, BP Cuff Size: Adult)   Pulse 64   Ht 1.676 m (5' 6\")   Wt 61.2 kg (135 lb)   SpO2 95%   BMI 21.79 kg/m²     Physical Exam   Constitutional: She is oriented to person, place, and time. She appears well-developed and well-nourished. No distress.   Pleasant, elderly woman accompanied by her very pleasant son in no distress.  Physical examination is unchanged compared to my previous on 11/10/2016 except where specified.   HENT:   Head: Normocephalic and atraumatic. "   Eyes: Pupils are equal, round, and reactive to light. Conjunctivae and EOM are normal. No scleral icterus.   Neck: Neck supple. No JVD present. No tracheal deviation present.   Cardiovascular: Normal rate, regular rhythm, normal heart sounds and intact distal pulses.  Exam reveals no gallop and no friction rub.    No murmur heard.  Pulses:       Radial pulses are 1+ on the right side, and 1+ on the left side.        Dorsalis pedis pulses are 1+ on the right side, and 1+ on the left side.   No carotid bruits   Pulmonary/Chest: Effort normal. No stridor. No respiratory distress. She has no wheezes. She has no rales.   Abdominal: Soft. Bowel sounds are normal. She exhibits no distension. There is no rebound.   Musculoskeletal: She exhibits no edema.   Neurological: She is alert and oriented to person, place, and time.   Skin: Skin is warm and dry. She is not diaphoretic. No erythema. No pallor.   Venous stasis changes noted in her legs bilaterally   Psychiatric: She has a normal mood and affect. Judgment and thought content normal.   Vitals reviewed.    Lab Results   Component Value Date/Time    WBC 7.9 06/26/2018 07:35 AM    RBC 4.05 (L) 06/26/2018 07:35 AM    HEMOGLOBIN 12.3 06/26/2018 07:35 AM    HEMATOCRIT 38.6 06/26/2018 07:35 AM    MCV 95.3 06/26/2018 07:35 AM    MCH 30.4 06/26/2018 07:35 AM    MCHC 31.9 (L) 06/26/2018 07:35 AM    MPV 10.5 06/26/2018 07:35 AM        Lab Results   Component Value Date/Time    SODIUM 142 06/26/2018 07:35 AM    POTASSIUM 5.2 06/26/2018 07:35 AM    CHLORIDE 113 (H) 06/26/2018 07:35 AM    CO2 21 06/26/2018 07:35 AM    GLUCOSE 103 (H) 06/26/2018 07:35 AM    BUN 38 (H) 06/26/2018 07:35 AM    CREATININE 1.40 06/26/2018 07:35 AM    CREATININE 1.07 (H) 03/15/2012 11:35 AM    BUNCREATRAT 20 05/25/2018 01:26 PM    BUNCREATRAT 25 03/15/2012 11:35 AM        Lab Results   Component Value Date/Time    ASTSGOT 26 06/26/2018 07:35 AM    ALTSGPT 27 06/26/2018 07:35 AM        Lab Results  "  Component Value Date/Time    CHOLSTRLTOT 146 2018 10:27 AM    CHOLSTRLTOT 278 (H) 03/15/2012 11:35 AM    LDL 52 2018 10:27 AM     (H) 03/15/2012 11:35 AM    HDL 72 2018 10:27 AM    HDL 67 03/15/2012 11:35 AM    TRIGLYCERIDE 109 2018 10:27 AM    TRIGLYCERIDE 123 03/15/2012 11:35 AM         Results for orders placed or performed in visit on 18   EKG   Result Value Ref Range    Report       [facility]    Test Date:  2018  Pt Name:    FOREST PATINO               Department: [facility]  MRN:        [id number]                      Room:  Gender:     Female                       Technician: FARSHAD  :        1945                   Requested By:IZAIAH CARSON  Order #:    [id number]                    Reading MD: Ashleigh Whitehead MD    Measurements  Intervals                                Axis  Rate:       47                           P:          61  KY:         164                          QRS:        55  QRSD:       94                           T:          -8  QT:         456  QTc:        404    Interpretive Statements  SINUS BRADYCARDIA  PROBABLE ANTEROSEPTAL INFARCT, OLD  BORDERLINE T ABNORMALITIES, INFERIOR LEADS  Compared to ECG 10/29/2013 18:18:04  No significant change noted  Electronically Signed On 2018 16:27:59 PDT by Ashleigh Whitehead MD       Labs, 2016  Chemistry panel: Sodium 143, potassium 5.7, chloride 106, CO2 19, BUN 34, creatinine 1.43, glucose 99  Lipid panel: , HDL 84, triglycerides 118, total cholesterol 279    Echocardiogram, 2018:  \"CONCLUSIONS  Compared to the prior Echo dated 16, no significant changes are   noted.  Left ventricular ejection fraction is visually estimated to be 65%.  Mild aortic insufficiency.  Estimated right ventricular systolic pressure  is 15 mmHg\"    Myocardial perfusion imaging, 6/15/2018:  \" NUCLEAR IMAGING INTERPRETATION   Moderate size reversible defect is noted in the basal and mid " "anterolateral    segments, apical lateral segment and the apical cap, concerning for ischemia.   The apex and the entire inferior wall are dyskinetic.  Otherwise global    hypokinesis is noted.   Severely reduced left ventricular systolic function.    The estimated ejection fraction is 29%.\"    Cardiac catheterization, 6/26/2018, images and report reviewed, my interpretation: Demonstrates a patent stent in her RCA but what was called a moderate lesion in her mid LAD that I probably a high-grade lesion.    Assessment:     1. Coronary artery disease of native artery of native heart with stable angina pectoris (HCC)     2. Essential hypertension     3. Pseudoaneurysm of right femoral artery, S/P repair 2006     4. Dyslipidemia     5. S/P coronary artery stent placement     6. Smoker         Medical Decision Making:  Today's Assessment / Status / Plan:     I discussed with her today the lesion in her LAD in conjunction with her symptoms of chest discomfort.  I do rather suspect that the lesion is high-grade though in the setting of stable symptoms, and normal left ventricular ejection fraction I have not pushed for intervention at this time.  I did emphasize with her the importance of smoking cessation, and adherence to her cardiovascular regimen.  Her blood pressure is well controlled on amlodipine and metoprolol tartrate both of which are helping with angina.  Her lipids are well controlled on Crestor, and she continues aspirin.    Reinier Perez MD  Cardiologist, [facility] [place]     Return in about 6 months (around 8/1/2019).      Physical Exam   Constitutional: She is oriented to person, place, and time. She appears well-developed and well-nourished. No distress.   Pleasant, elderly woman accompanied by her very pleasant son in no distress.  Physical examination is unchanged compared to my previous on 11/10/2016 except where specified.   HENT:   Head: Normocephalic and atraumatic.   Eyes: Pupils " are equal, round, and reactive to light. Conjunctivae and EOM are normal. No scleral icterus.   Neck: Neck supple. No JVD present. No tracheal deviation present.   Cardiovascular: Normal rate, regular rhythm, normal heart sounds and intact distal pulses.  Exam reveals no gallop and no friction rub.    No murmur heard.  Pulses:       Radial pulses are 1+ on the right side, and 1+ on the left side.        Dorsalis pedis pulses are 1+ on the right side, and 1+ on the left side.   No carotid bruits   Pulmonary/Chest: Effort normal. No stridor. No respiratory distress. She has no wheezes. She has no rales.   Abdominal: Soft. Bowel sounds are normal. She exhibits no distension. There is no rebound.   Musculoskeletal: She exhibits no edema.   Neurological: She is alert and oriented to person, place, and time.   Skin: Skin is warm and dry. She is not diaphoretic. No erythema. No pallor.   Venous stasis changes noted in her legs bilaterally   Psychiatric: She has a normal mood and affect. Judgment and thought content normal.   Vitals reviewed.

## 2019-03-05 ENCOUNTER — HOSPITAL ENCOUNTER (EMERGENCY)
Dept: HOSPITAL 8 - ED | Age: 74
Discharge: LEFT BEFORE DISCHARGE | End: 2019-03-05
Payer: MEDICARE

## 2019-03-05 VITALS — DIASTOLIC BLOOD PRESSURE: 65 MMHG | SYSTOLIC BLOOD PRESSURE: 126 MMHG

## 2019-03-05 VITALS — BODY MASS INDEX: 22.73 KG/M2 | HEIGHT: 67 IN | WEIGHT: 144.84 LBS

## 2019-03-05 DIAGNOSIS — R05: ICD-10-CM

## 2019-03-05 DIAGNOSIS — R07.9: ICD-10-CM

## 2019-03-05 DIAGNOSIS — R06.02: Primary | ICD-10-CM

## 2019-03-05 LAB
ALBUMIN SERPL-MCNC: 3.6 G/DL (ref 3.4–5)
ANION GAP SERPL CALC-SCNC: 5 MMOL/L (ref 5–15)
BASOPHILS # BLD AUTO: 0.1 X10^3/UL (ref 0–0.1)
BASOPHILS NFR BLD AUTO: 1 % (ref 0–1)
CALCIUM SERPL-MCNC: 9 MG/DL (ref 8.5–10.1)
CHLORIDE SERPL-SCNC: 110 MMOL/L (ref 98–107)
CREAT SERPL-MCNC: 1.36 MG/DL (ref 0.55–1.02)
EOSINOPHIL # BLD AUTO: 0.3 X10^3/UL (ref 0–0.4)
EOSINOPHIL NFR BLD AUTO: 3 % (ref 1–7)
ERYTHROCYTE [DISTWIDTH] IN BLOOD BY AUTOMATED COUNT: 14.9 % (ref 9.6–15.2)
LYMPHOCYTES # BLD AUTO: 2.21 X10^3/UL (ref 1–3.4)
LYMPHOCYTES NFR BLD AUTO: 20 % (ref 22–44)
MCH RBC QN AUTO: 31.1 PG (ref 27–34.8)
MCHC RBC AUTO-ENTMCNC: 33.3 G/DL (ref 32.4–35.8)
MCV RBC AUTO: 93.4 FL (ref 80–100)
MD: NO
MONOCYTES # BLD AUTO: 0.91 X10^3/UL (ref 0.2–0.8)
MONOCYTES NFR BLD AUTO: 8 % (ref 2–9)
NEUTROPHILS # BLD AUTO: 7.44 X10^3/UL (ref 1.8–6.8)
NEUTROPHILS NFR BLD AUTO: 68 % (ref 42–75)
PLATELET # BLD AUTO: 196 X10^3/UL (ref 130–400)
PMV BLD AUTO: 8.9 FL (ref 7.4–10.4)
RBC # BLD AUTO: 4.49 X10^6/UL (ref 3.82–5.3)
TROPONIN I SERPL-MCNC: < 0.015 NG/ML (ref 0–0.04)

## 2019-03-05 PROCEDURE — 83880 ASSAY OF NATRIURETIC PEPTIDE: CPT

## 2019-03-05 PROCEDURE — 83605 ASSAY OF LACTIC ACID: CPT

## 2019-03-05 PROCEDURE — 82040 ASSAY OF SERUM ALBUMIN: CPT

## 2019-03-05 PROCEDURE — 99284 EMERGENCY DEPT VISIT MOD MDM: CPT

## 2019-03-05 PROCEDURE — 84484 ASSAY OF TROPONIN QUANT: CPT

## 2019-03-05 PROCEDURE — 93005 ELECTROCARDIOGRAM TRACING: CPT

## 2019-03-05 PROCEDURE — 85025 COMPLETE CBC W/AUTO DIFF WBC: CPT

## 2019-03-05 PROCEDURE — 71045 X-RAY EXAM CHEST 1 VIEW: CPT

## 2019-03-05 PROCEDURE — 80048 BASIC METABOLIC PNL TOTAL CA: CPT

## 2019-03-05 PROCEDURE — 36415 COLL VENOUS BLD VENIPUNCTURE: CPT

## 2019-04-18 DIAGNOSIS — E78.01 FAMILIAL HYPERCHOLESTEROLEMIA: Chronic | ICD-10-CM

## 2019-04-18 RX ORDER — ROSUVASTATIN CALCIUM 40 MG/1
40 TABLET, COATED ORAL
Qty: 90 TAB | Refills: 3 | Status: SHIPPED | OUTPATIENT
Start: 2019-04-18

## 2019-05-02 ENCOUNTER — TELEPHONE (OUTPATIENT)
Dept: PULMONOLOGY | Facility: HOSPICE | Age: 74
End: 2019-05-02

## 2019-05-02 NOTE — TELEPHONE ENCOUNTER
Pt son called and said that since pt stated CPAP in August 2018 she has been in the Hospital twice and PCP twice because she has had sinus infections and Bronchitis infections. PCP told pt to contact Dr. Sun to see if the pt needs to change mask or what does Dr. Sun recommend to do?

## 2019-05-02 NOTE — TELEPHONE ENCOUNTER
Bia Sun M.D.   You 25 minutes ago (12:53 PM)      Replace CPAP mask/tubing now-  rinse CPAP mask daily.   Avoid smoking. (Routing comment)      Called pt and told her what Dr. Sun's note said. Pt said she will do as told

## 2019-05-08 ENCOUNTER — TELEPHONE (OUTPATIENT)
Dept: PULMONOLOGY | Facility: HOSPICE | Age: 74
End: 2019-05-08

## 2019-06-14 DIAGNOSIS — I10 ESSENTIAL HYPERTENSION: Primary | ICD-10-CM

## 2019-06-14 RX ORDER — AMLODIPINE BESYLATE 2.5 MG/1
TABLET ORAL
Qty: 90 TAB | Refills: 2 | Status: SHIPPED | OUTPATIENT
Start: 2019-06-14 | End: 2021-04-19

## 2020-02-15 ENCOUNTER — HOSPITAL ENCOUNTER (EMERGENCY)
Dept: HOSPITAL 8 - ED | Age: 75
Discharge: HOME | End: 2020-02-15
Payer: MEDICARE

## 2020-02-15 VITALS — HEIGHT: 65 IN | BODY MASS INDEX: 23.07 KG/M2 | WEIGHT: 138.45 LBS

## 2020-02-15 VITALS — SYSTOLIC BLOOD PRESSURE: 127 MMHG | DIASTOLIC BLOOD PRESSURE: 55 MMHG

## 2020-02-15 DIAGNOSIS — I25.2: ICD-10-CM

## 2020-02-15 DIAGNOSIS — I10: ICD-10-CM

## 2020-02-15 DIAGNOSIS — F17.210: ICD-10-CM

## 2020-02-15 DIAGNOSIS — J45.901: ICD-10-CM

## 2020-02-15 DIAGNOSIS — J15.9: Primary | ICD-10-CM

## 2020-02-15 LAB
ALBUMIN SERPL-MCNC: 3.3 G/DL (ref 3.4–5)
ANION GAP SERPL CALC-SCNC: 5 MMOL/L (ref 5–15)
BASOPHILS # BLD AUTO: 0.1 X10^3/UL (ref 0–0.1)
BASOPHILS NFR BLD AUTO: 1 % (ref 0–1)
CALCIUM SERPL-MCNC: 9.1 MG/DL (ref 8.5–10.1)
CHLORIDE SERPL-SCNC: 110 MMOL/L (ref 98–107)
CREAT SERPL-MCNC: 1.41 MG/DL (ref 0.55–1.02)
EOSINOPHIL # BLD AUTO: 0.3 X10^3/UL (ref 0–0.4)
EOSINOPHIL NFR BLD AUTO: 3 % (ref 1–7)
ERYTHROCYTE [DISTWIDTH] IN BLOOD BY AUTOMATED COUNT: 15.1 % (ref 9.6–15.2)
LYMPHOCYTES # BLD AUTO: 2 X10^3/UL (ref 1–3.4)
LYMPHOCYTES NFR BLD AUTO: 20 % (ref 22–44)
MCH RBC QN AUTO: 31.2 PG (ref 27–34.8)
MCHC RBC AUTO-ENTMCNC: 33.1 G/DL (ref 32.4–35.8)
MCV RBC AUTO: 94.3 FL (ref 80–100)
MD: NO
MONOCYTES # BLD AUTO: 0.97 X10^3/UL (ref 0.2–0.8)
MONOCYTES NFR BLD AUTO: 10 % (ref 2–9)
NEUTROPHILS # BLD AUTO: 6.86 X10^3/UL (ref 1.8–6.8)
NEUTROPHILS NFR BLD AUTO: 67 % (ref 42–75)
PLATELET # BLD AUTO: 222 X10^3/UL (ref 130–400)
PMV BLD AUTO: 8.1 FL (ref 7.4–10.4)
RBC # BLD AUTO: 4.38 X10^6/UL (ref 3.82–5.3)

## 2020-02-15 PROCEDURE — 82040 ASSAY OF SERUM ALBUMIN: CPT

## 2020-02-15 PROCEDURE — 36415 COLL VENOUS BLD VENIPUNCTURE: CPT

## 2020-02-15 PROCEDURE — 99284 EMERGENCY DEPT VISIT MOD MDM: CPT

## 2020-02-15 PROCEDURE — 85025 COMPLETE CBC W/AUTO DIFF WBC: CPT

## 2020-02-15 PROCEDURE — 80048 BASIC METABOLIC PNL TOTAL CA: CPT

## 2020-02-15 PROCEDURE — 71045 X-RAY EXAM CHEST 1 VIEW: CPT

## 2020-02-15 NOTE — NUR
PATIENT BROUGHT BACK FROM TRIAGE WITH CHIEF COMPLAINT OF COUGH FOR 1.5 WEEKS, 
WITH SORE THROAT AND CONGESTION. "I COUGH UP THICK GREEN STUFF"

## 2021-01-14 DIAGNOSIS — Z23 NEED FOR VACCINATION: ICD-10-CM

## 2021-04-19 DIAGNOSIS — I10 ESSENTIAL HYPERTENSION: ICD-10-CM

## 2021-04-20 RX ORDER — AMLODIPINE BESYLATE 2.5 MG/1
TABLET ORAL
Qty: 30 TABLET | Refills: 0 | Status: SHIPPED | OUTPATIENT
Start: 2021-04-20

## 2021-04-26 ENCOUNTER — TELEPHONE (OUTPATIENT)
Dept: CARDIOLOGY | Facility: MEDICAL CENTER | Age: 76
End: 2021-04-26

## 2021-04-26 NOTE — TELEPHONE ENCOUNTER
----- Message from Elizabeth Saucedo, Med Ass't sent at 4/19/2021  2:46 PM PDT -----  Regarding: Needs appointment  Please call patient for follow-up appointment. Thank you!

## 2021-04-26 NOTE — TELEPHONE ENCOUNTER
4/26/21 per pt wants to check with her PCP to see if she needs to see a cardiologist any longer.  If so, she will call back and make a follow up visit with Tiarra Loaiza. vw

## 2024-12-05 ENCOUNTER — OFFICE VISIT (OUTPATIENT)
Dept: SURGICAL ONCOLOGY | Facility: MEDICAL CENTER | Age: 79
End: 2024-12-05
Payer: MEDICARE

## 2024-12-05 VITALS
HEIGHT: 64 IN | TEMPERATURE: 97.4 F | OXYGEN SATURATION: 97 % | BODY MASS INDEX: 20.83 KG/M2 | WEIGHT: 122 LBS | SYSTOLIC BLOOD PRESSURE: 122 MMHG | DIASTOLIC BLOOD PRESSURE: 62 MMHG | HEART RATE: 61 BPM

## 2024-12-05 DIAGNOSIS — R19.4 ENCOUNTER FOR DIAGNOSTIC COLONOSCOPY DUE TO CHANGE IN BOWEL HABITS: ICD-10-CM

## 2024-12-05 DIAGNOSIS — C52 VAGINAL CANCER (HCC): ICD-10-CM

## 2024-12-05 DIAGNOSIS — F17.200 SMOKER: ICD-10-CM

## 2024-12-05 DIAGNOSIS — Z95.5 S/P CORONARY ARTERY STENT PLACEMENT: ICD-10-CM

## 2024-12-05 DIAGNOSIS — I10 ESSENTIAL HYPERTENSION: ICD-10-CM

## 2024-12-05 DIAGNOSIS — F17.210 CIGARETTE NICOTINE DEPENDENCE WITHOUT COMPLICATION: Chronic | ICD-10-CM

## 2024-12-05 DIAGNOSIS — F32.A DEPRESSION, UNSPECIFIED DEPRESSION TYPE: Chronic | ICD-10-CM

## 2024-12-05 PROCEDURE — 3078F DIAST BP <80 MM HG: CPT | Performed by: SURGERY

## 2024-12-05 PROCEDURE — 99205 OFFICE O/P NEW HI 60 MIN: CPT | Performed by: SURGERY

## 2024-12-05 PROCEDURE — 3074F SYST BP LT 130 MM HG: CPT | Performed by: SURGERY

## 2024-12-05 RX ORDER — HYDROCODONE BITARTRATE AND ACETAMINOPHEN 10; 325 MG/1; MG/1
1 TABLET ORAL 3 TIMES DAILY
COMMUNITY
Start: 2024-12-03

## 2024-12-06 ASSESSMENT — ENCOUNTER SYMPTOMS
SHORTNESS OF BREATH: 0
DIZZINESS: 0
BLURRED VISION: 0
HEMOPTYSIS: 0
DEPRESSION: 0
CHILLS: 0
BRUISES/BLEEDS EASILY: 0
HEADACHES: 0
COUGH: 0
NAUSEA: 0
WEAKNESS: 0
VOMITING: 0
NERVOUS/ANXIOUS: 0
BACK PAIN: 0
WEIGHT LOSS: 0
FALLS: 0
DIARRHEA: 0
FEVER: 0
BLOOD IN STOOL: 0
CONSTIPATION: 0

## 2024-12-06 NOTE — PROGRESS NOTES
Subjective:   12/5/2024  3:19 PM  Primary care physician:Pankaj España M.D.  Referring Provider: Dr Uday Zendejas  Radiation oncologist: Dr. Garg    Chief Complaint:   Chief Complaint   Patient presents with    New Patient     Diagnosis: No diagnosis found.    History of presenting illness:  Funmi Veronica  is a pleasant 79 y.o. year old female who presented with a history of complete hysterectomy at age 33 with complaints of postmenopausal bleeding.  Transvaginal ultrasound demonstrated a hypervascular mass.  Follow-up imaging with CT and MRI also confirmed locally invasive vaginal mass with left external iliac chain lymphadenopathy and she underwent a biopsy which was consistent with squamous cell carcinoma in situ.  She was referred to me for consideration of colonoscopy to rule out anorectal primary.  She has never had a colonoscopy.      Past Medical History:   Diagnosis Date    CAD (coronary artery disease) 3/12/2012    VLAD to RCA in '06 with complication of pseudoanuerysm in R fem site    Depression 3/12/2012    Hyperlipidemia 3/12/2012    Hypertension     Nicotine dependence 3/12/2012    Palpitations     Rheumatoid arthritis(714.0) 3/12/2012    Snoring 3/12/2012     Past Surgical History:   Procedure Laterality Date    ZZZ CARDIAC CATH       Allergies   Allergen Reactions    Atorvastatin Rash     Rash      Augmentin     Gold Salts      Outpatient Encounter Medications as of 12/5/2024   Medication Sig Dispense Refill    HYDROcodone/acetaminophen (NORCO)  MG Tab Take 1 Tablet by mouth 3 times a day.      amLODIPine (NORVASC) 2.5 MG Tab TAKE ONE TABLET BY MOUTH ONE TIME DAILY  30 tablet 0    rosuvastatin (CRESTOR) 40 MG tablet Take 1 Tab by mouth every bedtime. 90 Tab 3    metoprolol (LOPRESSOR) 25 MG Tab Take 0.5 Tabs by mouth 2 times a day. 60 Tab 11    allopurinol (ZYLOPRIM) 100 MG Tab Take 100 mg by mouth 2 times a day.      aspirin (ASA) 81 MG CHEW Take 81 mg by mouth every day.       budesonide-formoterol (SYMBICORT) 160-4.5 MCG/ACT Aerosol Inhale 2 Puffs by mouth 2 Times a Day. Use spacer. Rinse mouth after each use. (Patient not taking: Reported on 12/5/2024) 1 Inhaler 0    nitroglycerin (NITROSTAT) 0.4 MG SL Tab Place 1 Tab under tongue as needed for Chest Pain. (Patient not taking: Reported on 12/5/2024) 25 Tab 11    Hydrocodone-Acetaminophen (VICODIN PO) Take 10 mg by mouth. (Patient not taking: Reported on 12/5/2024)       No facility-administered encounter medications on file as of 12/5/2024.     Social History     Socioeconomic History    Marital status:      Spouse name: Not on file    Number of children: Not on file    Years of education: Not on file    Highest education level: Not on file   Occupational History    Not on file   Tobacco Use    Smoking status: Every Day     Current packs/day: 0.50     Average packs/day: 0.5 packs/day for 53.0 years (26.5 ttl pk-yrs)     Types: Cigarettes    Smokeless tobacco: Never   Substance and Sexual Activity    Alcohol use: Yes     Comment: occasionally    Drug use: No    Sexual activity: Not on file   Other Topics Concern    Not on file   Social History Narrative    Not on file     Social Drivers of Health     Financial Resource Strain: Not on file   Food Insecurity: Not on file   Transportation Needs: Not on file   Physical Activity: Not on file   Stress: Not on file   Social Connections: Not on file   Intimate Partner Violence: Not on file   Housing Stability: Not on file      Social History     Tobacco Use   Smoking Status Every Day    Current packs/day: 0.50    Average packs/day: 0.5 packs/day for 53.0 years (26.5 ttl pk-yrs)    Types: Cigarettes   Smokeless Tobacco Never     Social History     Substance and Sexual Activity   Alcohol Use Yes    Comment: occasionally     Social History     Substance and Sexual Activity   Drug Use No        Family History   Problem Relation Age of Onset    Heart Disease Mother 60        MI    Heart  "Disease Brother 38        MI     Review of Systems   Constitutional:  Negative for chills, fever, malaise/fatigue and weight loss.   Eyes:  Negative for blurred vision.   Respiratory:  Negative for cough, hemoptysis and shortness of breath.    Cardiovascular:  Negative for chest pain and leg swelling.   Gastrointestinal:  Negative for blood in stool, constipation, diarrhea, nausea and vomiting.   Genitourinary:  Negative for dysuria, hematuria and urgency.   Musculoskeletal:  Negative for back pain, falls and joint pain.   Skin:  Negative for rash.   Neurological:  Negative for dizziness, weakness and headaches.   Endo/Heme/Allergies: Negative.  Does not bruise/bleed easily.   Psychiatric/Behavioral:  Negative for depression. The patient is not nervous/anxious.         Objective:   /62 (BP Location: Right arm, Patient Position: Sitting, BP Cuff Size: Adult)   Pulse 61   Temp 36.3 °C (97.4 °F) (Temporal)   Ht 1.626 m (5' 4\")   Wt 55.3 kg (122 lb)   SpO2 97%   BMI 20.94 kg/m²     Physical Exam  Vitals and nursing note reviewed.   Constitutional:       Appearance: Normal appearance. She is normal weight.   HENT:      Head: Normocephalic.      Nose: Nose normal.      Mouth/Throat:      Mouth: Mucous membranes are moist.   Eyes:      Extraocular Movements: Extraocular movements intact.   Cardiovascular:      Rate and Rhythm: Normal rate and regular rhythm.      Pulses: Normal pulses.   Pulmonary:      Effort: Pulmonary effort is normal.   Abdominal:      General: Abdomen is flat.      Palpations: Abdomen is soft.   Musculoskeletal:         General: Normal range of motion.      Cervical back: Normal range of motion.   Skin:     General: Skin is warm and dry.   Neurological:      Mental Status: She is alert and oriented to person, place, and time.   Psychiatric:         Mood and Affect: Mood normal.         Behavior: Behavior normal.         Thought Content: Thought content normal.         Judgment: Judgment " normal.     Rectal exam is deferred until patient undergoes colonoscopy    Labs:  Preoperative labs demonstrated mild leukocytosis and no evidence of anemia.  She does have mild renal insufficiency with a creatinine of 1.22 and estimated GFR of 45 mL/min/1.73    Imagin2024 PET/CT with hyperbolic mass at the left portion of the vaginal cuff involving a small aspect of the left posterior bladder extending into the left ovarian ligament.  Measures approximately 5 x 3 x 3 cm.  No ascites seen.  2024 MRI demonstrates a soft tissue mass involving the left vaginal cuff measuring 4.5 x 3.7 x 2.9 cm.  The mass abuts the left posterior bladder wall just medial to the left ureteral vascular junction.  In addition there is a suspected contiguous invasion of the left posterior bladder wall.  The mass abuts the anterior wall of the proximal rectum.  Enlarged left external iliac chain lymph node measuring 10 x 16 mm    Pathology:  10/30/2024 vaginal biopsy reveals at least high-grade squamous dysplasia/carcinoma in situ LILI-3      Diagnosis:     1. Depression, unspecified depression type        2. Cigarette nicotine dependence without complication        3. Essential hypertension        4. Smoker        5. S/P coronary artery stent placement        6. 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  I discussed with the patient's gynecologist oncologist, Dr. Zendejas, regarding the unusual findings of squamous cell carcinoma invading from the vaginal wall into the bladder.  It will be important to rule out involvement or primary carcinoma of the anus.  I discussed with the patient prognosis and etiology of anal cancer and we discussed hard findings of biopsy as well as the MRI and CT.  Patient will be scheduled for a diagnostic  colonoscopy.  Risks, benefits, and alternatives were discussed with consenting person(s). Consenting person(s) were given an opportunity to ask questions and discuss other options. Risks including but not limited to failed or incomplete planned procedure, ineffective therapy, perforation, infection, bleeding, missed lesion(s), missed diagnosis, cardiac and/or pulmonary event, aspiration, stroke, possible need for surgery, hospitalization possibly prolonged, discomfort, unsuccessful and/or incomplete procedure, possible need for repeat procedures and/or additional testings, damage to adjacent organs and/or vascular structures, medication reaction, disability, death, and other adverse events possibly life-threatening. Discussion was undertaken with Layman's terms. Consenting persons stated understanding and acceptance of these risks, and wished to proceed. Consent was given in clear state of mind.  I spent a total of 60 minutes on this patient's care on the day of their visit excluding time spent related to any billed procedures. This time includes time spent with the patient as well as time spent documenting in the medical record, reviewing patient's records and tests, obtaining history, placing orders, communicating with other healthcare professionals, counseling the patient, family, or caregiver, and/or care coordination for the diagnoses above.   Scott Michaels MD PhD  [facility]  Colon and Rectal Surgeon  [phone]

## 2024-12-13 ENCOUNTER — APPOINTMENT (OUTPATIENT)
Dept: ADMISSIONS | Facility: MEDICAL CENTER | Age: 79
End: 2024-12-13
Attending: SURGERY
Payer: MEDICARE

## 2024-12-18 ENCOUNTER — PRE-ADMISSION TESTING (OUTPATIENT)
Dept: ADMISSIONS | Facility: MEDICAL CENTER | Age: 79
End: 2024-12-18
Attending: SURGERY
Payer: MEDICARE

## 2024-12-18 DIAGNOSIS — Z01.812 PRE-OPERATIVE LABORATORY EXAMINATION: ICD-10-CM

## 2024-12-18 DIAGNOSIS — Z01.810 PRE-OPERATIVE CARDIOVASCULAR EXAMINATION: ICD-10-CM

## 2024-12-18 DIAGNOSIS — Z01.811 PRE-OPERATIVE RESPIRATORY EXAMINATION: ICD-10-CM

## 2024-12-18 NOTE — PREADMIT AVS NOTE
Current Medications   Medication Instructions    Non Formulary Request; Vitamin A Stop 7 days before surgery    HYDROcodone/acetaminophen (NORCO)  MG Tab Continue taking medication prior to surgery    amLODIPine (NORVASC) 2.5 MG Tab Continue taking medication as prescribed, including morning of procedure     rosuvastatin (CRESTOR) 40 MG tablet Continue taking medication as prescribed, including morning of procedure     budesonide-formoterol (SYMBICORT) 160-4.5 MCG/ACT Aerosol Continue taking medication as prescribed, including morning of procedure     metoprolol (LOPRESSOR) 25 MG Tab Continue taking medication as prescribed, including morning of procedure     allopurinol (ZYLOPRIM) 100 MG Tab Continue taking medication as prescribed, including morning of procedure     nitroglycerin (NITROSTAT) 0.4 MG SL Tab Continue taking medication prior to surgery    aspirin (ASA) 81 MG CHEW Stop 7 days before surgery

## 2024-12-20 ENCOUNTER — PRE-ADMISSION TESTING (OUTPATIENT)
Dept: ADMISSIONS | Facility: MEDICAL CENTER | Age: 79
End: 2024-12-20
Attending: STUDENT IN AN ORGANIZED HEALTH CARE EDUCATION/TRAINING PROGRAM
Payer: MEDICARE

## 2024-12-20 ENCOUNTER — HOSPITAL ENCOUNTER (OUTPATIENT)
Dept: RADIOLOGY | Facility: MEDICAL CENTER | Age: 79
End: 2024-12-20
Attending: STUDENT IN AN ORGANIZED HEALTH CARE EDUCATION/TRAINING PROGRAM | Admitting: STUDENT IN AN ORGANIZED HEALTH CARE EDUCATION/TRAINING PROGRAM
Payer: MEDICARE

## 2024-12-20 DIAGNOSIS — Z01.810 PRE-OPERATIVE CARDIOVASCULAR EXAMINATION: ICD-10-CM

## 2024-12-20 DIAGNOSIS — Z01.811 PRE-OPERATIVE RESPIRATORY EXAMINATION: ICD-10-CM

## 2024-12-20 DIAGNOSIS — Z01.812 PRE-OPERATIVE LABORATORY EXAMINATION: ICD-10-CM

## 2024-12-20 LAB
ABO GROUP BLD: NORMAL
ALBUMIN SERPL BCP-MCNC: 3.7 G/DL (ref 3.2–4.9)
ALBUMIN/GLOB SERPL: 1.2 G/DL
ALP SERPL-CCNC: 92 U/L (ref 30–99)
ALT SERPL-CCNC: 23 U/L (ref 2–50)
ANION GAP SERPL CALC-SCNC: 11 MMOL/L (ref 7–16)
APTT PPP: 29.2 SEC (ref 24.7–36)
AST SERPL-CCNC: 37 U/L (ref 12–45)
BASOPHILS # BLD AUTO: 0.8 % (ref 0–1.8)
BASOPHILS # BLD: 0.07 K/UL (ref 0–0.12)
BILIRUB SERPL-MCNC: 0.2 MG/DL (ref 0.1–1.5)
BLD GP AB SCN SERPL QL: NORMAL
BUN SERPL-MCNC: 30 MG/DL (ref 8–22)
CALCIUM ALBUM COR SERPL-MCNC: 9.5 MG/DL (ref 8.5–10.5)
CALCIUM SERPL-MCNC: 9.3 MG/DL (ref 8.5–10.5)
CHLORIDE SERPL-SCNC: 108 MMOL/L (ref 96–112)
CO2 SERPL-SCNC: 21 MMOL/L (ref 20–33)
CREAT SERPL-MCNC: 1.21 MG/DL (ref 0.5–1.4)
EKG IMPRESSION: NORMAL
EOSINOPHIL # BLD AUTO: 0.3 K/UL (ref 0–0.51)
EOSINOPHIL NFR BLD: 3.5 % (ref 0–6.9)
ERYTHROCYTE [DISTWIDTH] IN BLOOD BY AUTOMATED COUNT: 53.3 FL (ref 35.9–50)
GFR SERPLBLD CREATININE-BSD FMLA CKD-EPI: 46 ML/MIN/1.73 M 2
GLOBULIN SER CALC-MCNC: 3.1 G/DL (ref 1.9–3.5)
GLUCOSE SERPL-MCNC: 108 MG/DL (ref 65–99)
HCT VFR BLD AUTO: 40.9 % (ref 37–47)
HGB BLD-MCNC: 12.8 G/DL (ref 12–16)
IMM GRANULOCYTES # BLD AUTO: 0.04 K/UL (ref 0–0.11)
IMM GRANULOCYTES NFR BLD AUTO: 0.5 % (ref 0–0.9)
INR PPP: 0.99 (ref 0.87–1.13)
LYMPHOCYTES # BLD AUTO: 1.96 K/UL (ref 1–4.8)
LYMPHOCYTES NFR BLD: 23 % (ref 22–41)
MCH RBC QN AUTO: 30.6 PG (ref 27–33)
MCHC RBC AUTO-ENTMCNC: 31.3 G/DL (ref 32.2–35.5)
MCV RBC AUTO: 97.8 FL (ref 81.4–97.8)
MONOCYTES # BLD AUTO: 0.77 K/UL (ref 0–0.85)
MONOCYTES NFR BLD AUTO: 9 % (ref 0–13.4)
NEUTROPHILS # BLD AUTO: 5.4 K/UL (ref 1.82–7.42)
NEUTROPHILS NFR BLD: 63.2 % (ref 44–72)
NRBC # BLD AUTO: 0 K/UL
NRBC BLD-RTO: 0 /100 WBC (ref 0–0.2)
PLATELET # BLD AUTO: 206 K/UL (ref 164–446)
PMV BLD AUTO: 10.8 FL (ref 9–12.9)
POTASSIUM SERPL-SCNC: 4.8 MMOL/L (ref 3.6–5.5)
PROT SERPL-MCNC: 6.8 G/DL (ref 6–8.2)
PROTHROMBIN TIME: 13.1 SEC (ref 12–14.6)
RBC # BLD AUTO: 4.18 M/UL (ref 4.2–5.4)
RH BLD: NORMAL
SODIUM SERPL-SCNC: 140 MMOL/L (ref 135–145)
WBC # BLD AUTO: 8.5 K/UL (ref 4.8–10.8)

## 2024-12-20 PROCEDURE — 86900 BLOOD TYPING SEROLOGIC ABO: CPT

## 2024-12-20 PROCEDURE — 85025 COMPLETE CBC W/AUTO DIFF WBC: CPT

## 2024-12-20 PROCEDURE — 85730 THROMBOPLASTIN TIME PARTIAL: CPT

## 2024-12-20 PROCEDURE — 86901 BLOOD TYPING SEROLOGIC RH(D): CPT

## 2024-12-20 PROCEDURE — 93010 ELECTROCARDIOGRAM REPORT: CPT | Performed by: INTERNAL MEDICINE

## 2024-12-20 PROCEDURE — 93005 ELECTROCARDIOGRAM TRACING: CPT | Mod: TC

## 2024-12-20 PROCEDURE — 80053 COMPREHEN METABOLIC PANEL: CPT

## 2024-12-20 PROCEDURE — 36415 COLL VENOUS BLD VENIPUNCTURE: CPT

## 2024-12-20 PROCEDURE — 85610 PROTHROMBIN TIME: CPT

## 2024-12-20 PROCEDURE — 71046 X-RAY EXAM CHEST 2 VIEWS: CPT

## 2024-12-20 PROCEDURE — 86850 RBC ANTIBODY SCREEN: CPT

## 2024-12-24 ENCOUNTER — HOSPITAL ENCOUNTER (OUTPATIENT)
Facility: MEDICAL CENTER | Age: 79
End: 2024-12-24
Attending: STUDENT IN AN ORGANIZED HEALTH CARE EDUCATION/TRAINING PROGRAM | Admitting: STUDENT IN AN ORGANIZED HEALTH CARE EDUCATION/TRAINING PROGRAM
Payer: MEDICARE

## 2024-12-24 ENCOUNTER — ANESTHESIA EVENT (OUTPATIENT)
Dept: SURGERY | Facility: MEDICAL CENTER | Age: 79
End: 2024-12-24
Payer: MEDICARE

## 2024-12-24 ENCOUNTER — ANESTHESIA (OUTPATIENT)
Dept: SURGERY | Facility: MEDICAL CENTER | Age: 79
End: 2024-12-24
Payer: MEDICARE

## 2024-12-24 VITALS
DIASTOLIC BLOOD PRESSURE: 65 MMHG | WEIGHT: 120.15 LBS | TEMPERATURE: 96.5 F | HEART RATE: 61 BPM | HEIGHT: 64 IN | BODY MASS INDEX: 20.51 KG/M2 | OXYGEN SATURATION: 93 % | RESPIRATION RATE: 16 BRPM | SYSTOLIC BLOOD PRESSURE: 146 MMHG

## 2024-12-24 LAB
ABO + RH BLD: NORMAL
HOLDING TUBE BB 8507: NORMAL
PATHOLOGY CONSULT NOTE: NORMAL

## 2024-12-24 PROCEDURE — 86900 BLOOD TYPING SEROLOGIC ABO: CPT

## 2024-12-24 PROCEDURE — 160025 RECOVERY II MINUTES (STATS): Performed by: SURGERY

## 2024-12-24 PROCEDURE — 88342 IMHCHEM/IMCYTCHM 1ST ANTB: CPT

## 2024-12-24 PROCEDURE — 36415 COLL VENOUS BLD VENIPUNCTURE: CPT

## 2024-12-24 PROCEDURE — 160009 HCHG ANES TIME/MIN: Performed by: SURGERY

## 2024-12-24 PROCEDURE — 160002 HCHG RECOVERY MINUTES (STAT): Performed by: SURGERY

## 2024-12-24 PROCEDURE — 160046 HCHG PACU - 1ST 60 MINS PHASE II: Performed by: SURGERY

## 2024-12-24 PROCEDURE — 45172 EXC RECT TUM TRANSANAL FULL: CPT | Performed by: SURGERY

## 2024-12-24 PROCEDURE — 45378 DIAGNOSTIC COLONOSCOPY: CPT | Performed by: SURGERY

## 2024-12-24 PROCEDURE — 160039 HCHG SURGERY MINUTES - EA ADDL 1 MIN LEVEL 3: Performed by: SURGERY

## 2024-12-24 PROCEDURE — 700101 HCHG RX REV CODE 250: Performed by: STUDENT IN AN ORGANIZED HEALTH CARE EDUCATION/TRAINING PROGRAM

## 2024-12-24 PROCEDURE — 88305 TISSUE EXAM BY PATHOLOGIST: CPT

## 2024-12-24 PROCEDURE — 700105 HCHG RX REV CODE 258: Performed by: STUDENT IN AN ORGANIZED HEALTH CARE EDUCATION/TRAINING PROGRAM

## 2024-12-24 PROCEDURE — 160028 HCHG SURGERY MINUTES - 1ST 30 MINS LEVEL 3: Performed by: SURGERY

## 2024-12-24 PROCEDURE — 700111 HCHG RX REV CODE 636 W/ 250 OVERRIDE (IP): Performed by: STUDENT IN AN ORGANIZED HEALTH CARE EDUCATION/TRAINING PROGRAM

## 2024-12-24 PROCEDURE — 160048 HCHG OR STATISTICAL LEVEL 1-5: Performed by: SURGERY

## 2024-12-24 PROCEDURE — 160035 HCHG PACU - 1ST 60 MINS PHASE I: Performed by: SURGERY

## 2024-12-24 PROCEDURE — 86901 BLOOD TYPING SEROLOGIC RH(D): CPT

## 2024-12-24 RX ORDER — DIPHENHYDRAMINE HYDROCHLORIDE 50 MG/ML
12.5 INJECTION INTRAMUSCULAR; INTRAVENOUS
Status: DISCONTINUED | OUTPATIENT
Start: 2024-12-24 | End: 2024-12-24 | Stop reason: HOSPADM

## 2024-12-24 RX ORDER — EPHEDRINE SULFATE 50 MG/ML
INJECTION, SOLUTION INTRAVENOUS PRN
Status: DISCONTINUED | OUTPATIENT
Start: 2024-12-24 | End: 2024-12-24 | Stop reason: SURG

## 2024-12-24 RX ORDER — ONDANSETRON 2 MG/ML
4 INJECTION INTRAMUSCULAR; INTRAVENOUS
Status: DISCONTINUED | OUTPATIENT
Start: 2024-12-24 | End: 2024-12-24 | Stop reason: HOSPADM

## 2024-12-24 RX ORDER — ONDANSETRON 2 MG/ML
INJECTION INTRAMUSCULAR; INTRAVENOUS PRN
Status: DISCONTINUED | OUTPATIENT
Start: 2024-12-24 | End: 2024-12-24 | Stop reason: SURG

## 2024-12-24 RX ORDER — SODIUM CHLORIDE, SODIUM LACTATE, POTASSIUM CHLORIDE, CALCIUM CHLORIDE 600; 310; 30; 20 MG/100ML; MG/100ML; MG/100ML; MG/100ML
INJECTION, SOLUTION INTRAVENOUS
Status: DISCONTINUED | OUTPATIENT
Start: 2024-12-24 | End: 2024-12-24 | Stop reason: SURG

## 2024-12-24 RX ORDER — HYDROMORPHONE HYDROCHLORIDE 1 MG/ML
0.1 INJECTION, SOLUTION INTRAMUSCULAR; INTRAVENOUS; SUBCUTANEOUS
Status: DISCONTINUED | OUTPATIENT
Start: 2024-12-24 | End: 2024-12-24 | Stop reason: HOSPADM

## 2024-12-24 RX ORDER — SODIUM CHLORIDE, SODIUM LACTATE, POTASSIUM CHLORIDE, CALCIUM CHLORIDE 600; 310; 30; 20 MG/100ML; MG/100ML; MG/100ML; MG/100ML
INJECTION, SOLUTION INTRAVENOUS CONTINUOUS
Status: DISCONTINUED | OUTPATIENT
Start: 2024-12-24 | End: 2024-12-24 | Stop reason: HOSPADM

## 2024-12-24 RX ORDER — OXYCODONE HCL 5 MG/5 ML
5 SOLUTION, ORAL ORAL
Status: DISCONTINUED | OUTPATIENT
Start: 2024-12-24 | End: 2024-12-24 | Stop reason: HOSPADM

## 2024-12-24 RX ORDER — ALBUTEROL SULFATE 5 MG/ML
2.5 SOLUTION RESPIRATORY (INHALATION)
Status: DISCONTINUED | OUTPATIENT
Start: 2024-12-24 | End: 2024-12-24 | Stop reason: HOSPADM

## 2024-12-24 RX ORDER — HYDROMORPHONE HYDROCHLORIDE 1 MG/ML
0.2 INJECTION, SOLUTION INTRAMUSCULAR; INTRAVENOUS; SUBCUTANEOUS
Status: DISCONTINUED | OUTPATIENT
Start: 2024-12-24 | End: 2024-12-24 | Stop reason: HOSPADM

## 2024-12-24 RX ORDER — HYDRALAZINE HYDROCHLORIDE 20 MG/ML
5 INJECTION INTRAMUSCULAR; INTRAVENOUS
Status: DISCONTINUED | OUTPATIENT
Start: 2024-12-24 | End: 2024-12-24 | Stop reason: HOSPADM

## 2024-12-24 RX ORDER — HYDROMORPHONE HYDROCHLORIDE 1 MG/ML
0.4 INJECTION, SOLUTION INTRAMUSCULAR; INTRAVENOUS; SUBCUTANEOUS
Status: DISCONTINUED | OUTPATIENT
Start: 2024-12-24 | End: 2024-12-24 | Stop reason: HOSPADM

## 2024-12-24 RX ORDER — OXYCODONE HCL 5 MG/5 ML
10 SOLUTION, ORAL ORAL
Status: DISCONTINUED | OUTPATIENT
Start: 2024-12-24 | End: 2024-12-24 | Stop reason: HOSPADM

## 2024-12-24 RX ORDER — HALOPERIDOL 5 MG/ML
1 INJECTION INTRAMUSCULAR
Status: DISCONTINUED | OUTPATIENT
Start: 2024-12-24 | End: 2024-12-24 | Stop reason: HOSPADM

## 2024-12-24 RX ORDER — DEXAMETHASONE SODIUM PHOSPHATE 4 MG/ML
INJECTION, SOLUTION INTRA-ARTICULAR; INTRALESIONAL; INTRAMUSCULAR; INTRAVENOUS; SOFT TISSUE PRN
Status: DISCONTINUED | OUTPATIENT
Start: 2024-12-24 | End: 2024-12-24 | Stop reason: SURG

## 2024-12-24 RX ORDER — LABETALOL HYDROCHLORIDE 5 MG/ML
5 INJECTION, SOLUTION INTRAVENOUS
Status: DISCONTINUED | OUTPATIENT
Start: 2024-12-24 | End: 2024-12-24 | Stop reason: HOSPADM

## 2024-12-24 RX ORDER — LIDOCAINE HYDROCHLORIDE 20 MG/ML
INJECTION, SOLUTION EPIDURAL; INFILTRATION; INTRACAUDAL; PERINEURAL PRN
Status: DISCONTINUED | OUTPATIENT
Start: 2024-12-24 | End: 2024-12-24 | Stop reason: SURG

## 2024-12-24 RX ADMIN — PROPOFOL 50 MG: 10 INJECTION, EMULSION INTRAVENOUS at 12:31

## 2024-12-24 RX ADMIN — FENTANYL CITRATE 25 MCG: 50 INJECTION, SOLUTION INTRAMUSCULAR; INTRAVENOUS at 12:10

## 2024-12-24 RX ADMIN — PROPOFOL 150 MG: 10 INJECTION, EMULSION INTRAVENOUS at 11:48

## 2024-12-24 RX ADMIN — ONDANSETRON 4 MG: 2 INJECTION INTRAMUSCULAR; INTRAVENOUS at 11:52

## 2024-12-24 RX ADMIN — SODIUM CHLORIDE, POTASSIUM CHLORIDE, SODIUM LACTATE AND CALCIUM CHLORIDE: 600; 310; 30; 20 INJECTION, SOLUTION INTRAVENOUS at 11:43

## 2024-12-24 RX ADMIN — PROPOFOL 50 MG: 10 INJECTION, EMULSION INTRAVENOUS at 12:07

## 2024-12-24 RX ADMIN — LIDOCAINE HYDROCHLORIDE 60 MG: 20 INJECTION, SOLUTION EPIDURAL; INFILTRATION; INTRACAUDAL; PERINEURAL at 11:48

## 2024-12-24 RX ADMIN — FENTANYL CITRATE 25 MCG: 50 INJECTION, SOLUTION INTRAMUSCULAR; INTRAVENOUS at 12:30

## 2024-12-24 RX ADMIN — FENTANYL CITRATE 25 MCG: 50 INJECTION, SOLUTION INTRAMUSCULAR; INTRAVENOUS at 12:24

## 2024-12-24 RX ADMIN — EPHEDRINE SULFATE 10 MG: 50 INJECTION, SOLUTION INTRAVENOUS at 11:53

## 2024-12-24 RX ADMIN — DEXAMETHASONE SODIUM PHOSPHATE 4 MG: 4 INJECTION INTRA-ARTICULAR; INTRALESIONAL; INTRAMUSCULAR; INTRAVENOUS; SOFT TISSUE at 11:52

## 2024-12-24 ASSESSMENT — PAIN DESCRIPTION - PAIN TYPE
TYPE: SURGICAL PAIN

## 2024-12-24 ASSESSMENT — FIBROSIS 4 INDEX: FIB4 SCORE: 2.96

## 2024-12-24 ASSESSMENT — PAIN SCALES - GENERAL: PAIN_LEVEL: 0

## 2024-12-24 NOTE — ANESTHESIA TIME REPORT
Anesthesia Start and Stop Event Times       Date Time Event    12/24/2024 1118 Ready for Procedure     1143 Anesthesia Start     1241 Anesthesia Stop          Responsible Staff  12/24/24      Name Role Begin End    Nas Quiros M.D. Anesth 1143 1241          Overtime Reason:  no overtime (within assigned shift)    Comments:

## 2024-12-24 NOTE — DISCHARGE INSTRUCTIONS
HOME CARE INSTRUCTIONS    ACTIVITY: Rest and take it easy for the first 24 hours.  A responsible adult is recommended to remain with you during that time.  It is normal to feel sleepy.  We encourage you to not do anything that requires balance, judgment or coordination.    FOR 24 HOURS DO NOT:  Drive, operate machinery or run household appliances.  Drink beer or alcoholic beverages.  Make important decisions or sign legal documents.    SPECIAL INSTRUCTIONS:     1. Nothing in vagina (ie no tampons, douching, intercourse) for minimum of 6 weeks  2. No driving while taking narcotics   3. Return to our office as directed and call to confirm appointment  Call our office [phone] if you develop any fevers, chills, nausea/vomiting, heavy vaginal bleeding, or redness, tenderness, or if you have persistent watery discharge while ambulating or stool draining from the vagina.   4. You may have vaginal spotting or light bleeding which is normal. Call if you have heavy bleeding  5. If you have not had a bowel movement for 2 days, please take over the counter Milk of Magnesium, 1 tablespoon every 4 hours. After 4 doses and if you still have not had a bowel movement, please call your doctor.   6. Resume your regular diet     DIET: To avoid nausea, slowly advance diet as tolerated, avoiding spicy or greasy foods for the first day.  Add more substantial food to your diet according to your physician's instructions.  Babies can be fed formula or breast milk as soon as they are hungry.  INCREASE FLUIDS AND FIBER TO AVOID CONSTIPATION.        MEDICATIONS: Resume taking daily medication.  Take prescribed pain medication with food.  If no medication is prescribed, you may take non-aspirin pain medication if needed.  PAIN MEDICATION CAN BE VERY CONSTIPATING.  Take a stool softener or laxative such as senokot, pericolace, or milk of magnesia if needed.     No new Prescription given       A follow-up appointment should be arranged with  your doctor in    [phone]   ; call to schedule.    You should CALL YOUR PHYSICIAN if you develop:  Fever greater than 101 degrees F.  Pain not relieved by medication, or persistent nausea or vomiting.  Excessive bleeding (blood soaking through dressing) or unexpected drainage from the wound.  Extreme redness or swelling around the incision site, drainage of pus or foul smelling drainage.  Inability to urinate or empty your bladder within 8 hours.  Problems with breathing or chest pain.    You should call 911 if you develop problems with breathing or chest pain.  If you are unable to contact your doctor or surgical center, you should go to the nearest emergency room or urgent care center.  Physician's telephone #:    [phone]       MILD FLU-LIKE SYMPTOMS ARE NORMAL.  YOU MAY EXPERIENCE GENERALIZED MUSCLE ACHES, THROAT IRRITATION, HEADACHE AND/OR SOME NAUSEA.    If any questions arise, call your doctor.  If your doctor is not available, please feel free to call the Surgical Center at [phone].  The Center is open Monday through Friday from 7AM to 7PM.      A registered nurse may call you a few days after your surgery to see how you are doing after your procedure.    You may also receive a survey in the mail within the next two weeks and we ask that you take a few moments to complete the survey and return it to us.  Our goal is to provide you with very good care and we value your comments.     Depression / Suicide Risk    As you are discharged from this [facility] facility, it is important to learn how to keep safe from harming yourself.    Recognize the warning signs:  Abrupt changes in personality, positive or negative- including increase in energy   Giving away possessions  Change in eating patterns- significant weight changes-  positive or negative  Change in sleeping patterns- unable to sleep or sleeping all the time   Unwillingness or inability to communicate  Depression  Unusual sadness,  discouragement and loneliness  Talk of wanting to die  Neglect of personal appearance   Rebelliousness- reckless behavior  Withdrawal from people/activities they love  Confusion- inability to concentrate     If you or a loved one observes any of these behaviors or has concerns about self-harm, here's what you can do:  Talk about it- your feelings and reasons for harming yourself  Remove any means that you might use to hurt yourself (examples: pills, rope, extension cords, firearm)  Get professional help from the community (Mental Health, Substance Abuse, psychological counseling)  Do not be alone:Call your Safe Contact- someone whom you trust who will be there for you.  Call your local CRISIS HOTLINE 913-4383 or 367-918-5738  Call your local Children's Mobile Crisis Response Team [place] (727) 535-0587 or www.IM-Sense  Call the toll free National Suicide Prevention Hotlines   National Suicide Prevention Lifeline 298-845-IGRA (8239)  National Hope Line Network 800-SUICIDE (865-8501)    I acknowledge receipt and understanding of these Home Care instructions.

## 2024-12-24 NOTE — OR NURSING
1342 Report received from PACU Rn and patient arrived to phase 2. AO x 4.    1345 Vital signs stable. Pain level 0/10, no complaints of n/v. Dressing with assistance.     1359 Patient states ready to go home.  VSS, patient has all belongings. IV removed. Discharge instructions discussed with patient and son.  Questions answered. Patient and family verbalized understanding.  Patient taken out with wheelchair and all belongings.

## 2024-12-24 NOTE — ANESTHESIA PROCEDURE NOTES
Airway    Date/Time: 12/24/2024 11:49 AM    Performed by: Nas Quiros M.D.  Authorized by: Nas Quiros M.D.    Location:  OR  Urgency:  Elective  Indications for Airway Management:  Anesthesia      Spontaneous Ventilation: absent    Sedation Level:  Deep  Preoxygenated: Yes    Mask Difficulty Assessment:  0 - not attempted  Final Airway Type:  Supraglottic airway  Final Supraglottic Airway:  Standard LMA    SGA Size:  3  Number of Attempts at Approach:  1

## 2024-12-24 NOTE — ANESTHESIA PREPROCEDURE EVALUATION
Case: [id number] Date/Time: 12/24/24 1145    Procedures:       RECTAL EXAM UNDER ANESTHESIA AND COLONOSCOPY      VAGINAL BIOPSY, EXAM UNDER ANESTHESIA    Pre-op diagnosis: VAGINAL CANCER, BLEEDING    Location: [facility] OR 11 / SURGERY [facility]    Surgeons: Scott Michaels M.D.; Monica Montague M.D.            Relevant Problems   CARDIAC   (positive) Coronary artery disease of native artery of native heart with stable angina pectoris (HCC)   (positive) Essential hypertension   (positive) Pseudoaneurysm of right femoral artery, S/P repair 2006      Other   (positive) Nicotine dependence   (positive) Rheumatoid arthritis (CMS-HCC)   (positive) S/P coronary artery stent placement   (positive) Smoker     No prior anesthetic complications. Appropriately NPO.    Vitals:    12/24/24 1031   BP: (!) 163/70   Pulse: 60   Resp: 18   Temp: (!) 35.6 °C (96 °F)   SpO2: 99%        Physical Exam    Airway   Mallampati: II  TM distance: >3 FB  Neck ROM: full       Cardiovascular - normal exam  Rhythm: regular  Rate: normal  (-) murmur     Dental - normal exam  (+) upper dentures  Comments: Missing lower molars         Pulmonary - normal exam  Breath sounds clear to auscultation     Abdominal    Neurological - normal exam                   Anesthesia Plan    ASA 3   ASA physical status 3 criteria: CAD/stents (> 3 months)    Plan - general       Airway plan will be LMA          Induction: intravenous    Postoperative Plan: Postoperative administration of opioids is intended.    Pertinent diagnostic labs and testing reviewed    Informed Consent:    Anesthetic plan and risks discussed with patient.    Use of blood products discussed with: patient whom consented to blood products.

## 2024-12-24 NOTE — OP REPORT
OPERATIVE REPORT     DATE: 12/24/24     PREOPERATIVE DIAGNOSIS:  Vaginal mass      POSTOPERATIVE DIAGNOSIS:  Same      PROCEDURES:  Exam under anesthesia, vaginal biopsy    SURGEON: Monica Montague MD     COMPLICATIONS:  None     ANESTHESIA:  General endotracheal anesthetic     ESTIMATED BLOOD LOSS:  50 mL     IV FLUIDS:  Per anesthesia record      DRAINS:  None     PATHOLOGY SPECIMENS:  1) Vaginal mass biopsies     OPERATIVE FINDINGS:  Normal external genitalia, vagina with small areas of nodular firmness over posterior central vagina and under urethra, as well as firm cratered ulcerated lesion in left vaginal fornix extending under urethra. Visually the mass is ulcerated and cratered in left vaginal fornix, no protruding mass. Mass is friable approximately 5 cm in size. Several biopsies and a Steve-cut biopsy were taken to ensure adequate viable tissue obtained. No rectal masses, normal rectovaginal septum.      INDICATIONS:   Funmi Veronica is a 79 y.o. with a vaginal mass, PET with hypermetabolic vaginal mass possibly involving bladder, MRI with same findings as well as enlarged external iliac chain lymph node. Biopsy of mass returned as high grade squamous dysplasia/carcinoma in situ. Due to clinical concern for malignancy, repeat vaginal biopsy for diagnosis was recommended. Options were reviewed including risks, benefits, and alternatives, and she desired to proceed with surgical management.      DETAILS OF THE PROCEDURE:  The patient was brought to the operating room where anesthesia was administered. She was positioned in dorsal lithotomy position and prepped and draped in the usual sterile fashion.  Dr. Michaels performed colonoscopy first and removed anal polyp, see his procedure note for full details.  Exam under anesthesia was performed noting above findings. Several mass biopsies were taken with Tischlers, and a Steve-cut biopsy was obtained to ensure viable tissue was procured.   Pressure and  Monsel's solution were applied and hemostasis was confirmed.    All counts were correct x2 at conclusion of the case. The patient was extubated and transported stable to PACU.      Monica Montague MD  Gynecologic Oncology

## 2024-12-24 NOTE — OR NURSING
Patient arrived in PACU, VSS. Patient alert and oriented x4 with no pain at this time.  Dentures placed in mouth.   Report called to ANDRE Pitts. Transported to phase 2 on room air.

## 2024-12-24 NOTE — ANESTHESIA POSTPROCEDURE EVALUATION
Patient: Funmi Veronica    Procedure Summary       Date: 12/24/24 Room / Location: [facility] 11 / SURGERY [facility]    Anesthesia Start: 1143 Anesthesia Stop: 1241    Procedures:       RECTAL EXAM UNDER ANESTHESIA AND COLONOSCOPY (Anus)      VAGINAL BIOPSY, EXAM UNDER ANESTHESIA (Vagina ) Diagnosis: (VAGINAL CANCER, BLEEDING)    Surgeons: Scott Michaels M.D.; Monica Montague M.D. Responsible Provider: Nas Quiros M.D.    Anesthesia Type: general ASA Status: 3            Final Anesthesia Type: general  Last vitals  BP   Blood Pressure : (!) 163/70    Temp   (!) 35.6 °C (96 °F)    Pulse   60   Resp   18    SpO2   99 %      Anesthesia Post Evaluation    Patient location during evaluation: PACU  Patient participation: waiting for patient participation  Level of consciousness: sleepy but conscious  Pain score: 0    Airway patency: patent  Anesthetic complications: no  Cardiovascular status: hemodynamically stable  Respiratory status: acceptable, face mask and oral airway  Hydration status: euvolemic    PONV: none          No notable events documented.

## 2025-01-02 DIAGNOSIS — C21.0 ANAL CANCER (HCC): ICD-10-CM

## 2025-01-02 NOTE — OP REPORT
Colonoscopy Report    Date: 12/24/2024    Surgeon: Scott Michaels    Sedation: monitored anesthesia care provided by Dr. Quiros    Pre-operative Diagnosis: Vaginal mass rule out metastatic versus primary squamous cell carcinoma    Post-operative Diagnosis: Same    Procedure:   Colonoscopy proximal to the splenic flexure  Biopsy of anal polyp    Indications: This is a 79-year-old female with a vaginal mass and pet imaging worrisome for malignancy and MRI with external iliac lymphadenopathy.  Previous biopsies of the mass returned high-grade squamous dysplasia or carcinoma in situ in the patient is taken for colonoscopy versus examination under anesthesia with possible anal biopsy to evaluate for possible vaginal metastasis versus primary vaginal squamous cell carcinoma    Findings: Small anal polyp which was excised in its entirety.  Otherwise normal colonoscopy.    Procedure in detail:   This is an average risk patient.  This is a diagnostic colonoscopy. The procedure, indications, preparation and potential complications were explained to the patient, who indicated understanding and signed the corresponding consent forms.  Monitored anesthesia care was provided by anesthesiologist.  Continuous monitoring was used throughout the procedure and supplemental oxygen was administered.The quality of the preparation was fair and likely inadequate to identify polyps measuring less than 5 mm.  The patient was placed in the modified lithotomy position.  Digital rectal examination failed to demonstrate any abnormal findings.  The flexible colonoscope was introduced through the rectum and advanced under direct visualization until the cecum was reached.  The appendiceal orifice and ileocecal valve were identified.  The colonoscope was not retroflexed within the rectum.  Careful visualization was performed as the instrument was withdrawn.    Patient's vagina and anus and rectum were then prepped and draped in the usual standard  sterile fashion.  I began by performing a perianal nerve block with half percent Marcaine and gently dilated the anus until 3 fingers could be introduced.  A medium sized Hill-Neil retractor was placed within the anal canal and a detailed inspection was made of all 4 quadrants.  There was an obvious anal polyp in the left lateral position.  Using electrocautery this was excised and sent for permanent pathology and hemostasis was obtained.  I then turned the case over to Dr. Montague, please see her operative report for details.  Patient tolerates to the procedure was good.  The procedure was not difficult.    Scott Michaels MD PhD  [facility]  Colon and Rectal Surgeon  [phone]

## 2025-01-10 ENCOUNTER — PATIENT OUTREACH (OUTPATIENT)
Dept: ONCOLOGY | Facility: MEDICAL CENTER | Age: 80
End: 2025-01-10
Payer: MEDICARE

## 2025-01-10 NOTE — PROGRESS NOTES
Follow up on referral. Spoke with Son Adarsh., appt made at [facility], and pt scheduled for lung biopsy.

## 2025-05-20 ENCOUNTER — APPOINTMENT (OUTPATIENT)
Dept: RADIOLOGY | Facility: MEDICAL CENTER | Age: 80
End: 2025-05-20
Attending: EMERGENCY MEDICINE
Payer: MEDICARE

## 2025-05-20 ENCOUNTER — HOSPITAL ENCOUNTER (EMERGENCY)
Facility: MEDICAL CENTER | Age: 80
End: 2025-05-20
Attending: EMERGENCY MEDICINE
Payer: MEDICARE

## 2025-05-20 VITALS
DIASTOLIC BLOOD PRESSURE: 69 MMHG | TEMPERATURE: 98 F | SYSTOLIC BLOOD PRESSURE: 159 MMHG | OXYGEN SATURATION: 97 % | HEART RATE: 55 BPM | BODY MASS INDEX: 20.49 KG/M2 | WEIGHT: 120 LBS | RESPIRATION RATE: 17 BRPM | HEIGHT: 64 IN

## 2025-05-20 DIAGNOSIS — R53.1 WEAKNESS: Primary | ICD-10-CM

## 2025-05-20 DIAGNOSIS — R51.9 ACUTE NONINTRACTABLE HEADACHE, UNSPECIFIED HEADACHE TYPE: ICD-10-CM

## 2025-05-20 LAB
ABO GROUP BLD: NORMAL
ALBUMIN SERPL BCP-MCNC: 3.8 G/DL (ref 3.2–4.9)
ALBUMIN/GLOB SERPL: 1.4 G/DL
ALP SERPL-CCNC: 104 U/L (ref 30–99)
ALT SERPL-CCNC: 8 U/L (ref 2–50)
ANION GAP SERPL CALC-SCNC: 12 MMOL/L (ref 7–16)
APTT PPP: 32.8 SEC (ref 24.7–36)
AST SERPL-CCNC: 19 U/L (ref 12–45)
BASOPHILS # BLD AUTO: 0.8 % (ref 0–1.8)
BASOPHILS # BLD: 0.04 K/UL (ref 0–0.12)
BILIRUB SERPL-MCNC: <0.2 MG/DL (ref 0.1–1.5)
BLD GP AB SCN SERPL QL: NORMAL
BUN SERPL-MCNC: 41 MG/DL (ref 8–22)
CALCIUM ALBUM COR SERPL-MCNC: 9.3 MG/DL (ref 8.5–10.5)
CALCIUM SERPL-MCNC: 9.1 MG/DL (ref 8.5–10.5)
CHLORIDE SERPL-SCNC: 111 MMOL/L (ref 96–112)
CO2 SERPL-SCNC: 17 MMOL/L (ref 20–33)
CREAT SERPL-MCNC: 1.48 MG/DL (ref 0.5–1.4)
EKG IMPRESSION: NORMAL
EOSINOPHIL # BLD AUTO: 0.26 K/UL (ref 0–0.51)
EOSINOPHIL NFR BLD: 5.3 % (ref 0–6.9)
ERYTHROCYTE [DISTWIDTH] IN BLOOD BY AUTOMATED COUNT: 60.7 FL (ref 35.9–50)
GFR SERPLBLD CREATININE-BSD FMLA CKD-EPI: 36 ML/MIN/1.73 M 2
GLOBULIN SER CALC-MCNC: 2.7 G/DL (ref 1.9–3.5)
GLUCOSE SERPL-MCNC: 98 MG/DL (ref 65–99)
HCT VFR BLD AUTO: 30.4 % (ref 37–47)
HGB BLD-MCNC: 9.6 G/DL (ref 12–16)
IMM GRANULOCYTES # BLD AUTO: 0.04 K/UL (ref 0–0.11)
IMM GRANULOCYTES NFR BLD AUTO: 0.8 % (ref 0–0.9)
INR PPP: 1.53 (ref 0.87–1.13)
LYMPHOCYTES # BLD AUTO: 0.75 K/UL (ref 1–4.8)
LYMPHOCYTES NFR BLD: 15.2 % (ref 22–41)
MCH RBC QN AUTO: 31.8 PG (ref 27–33)
MCHC RBC AUTO-ENTMCNC: 31.6 G/DL (ref 32.2–35.5)
MCV RBC AUTO: 100.7 FL (ref 81.4–97.8)
MONOCYTES # BLD AUTO: 0.68 K/UL (ref 0–0.85)
MONOCYTES NFR BLD AUTO: 13.7 % (ref 0–13.4)
NEUTROPHILS # BLD AUTO: 3.18 K/UL (ref 1.82–7.42)
NEUTROPHILS NFR BLD: 64.2 % (ref 44–72)
NRBC # BLD AUTO: 0 K/UL
NRBC BLD-RTO: 0 /100 WBC (ref 0–0.2)
PLATELET # BLD AUTO: 137 K/UL (ref 164–446)
PMV BLD AUTO: 10.2 FL (ref 9–12.9)
POTASSIUM SERPL-SCNC: 5.6 MMOL/L (ref 3.6–5.5)
PROT SERPL-MCNC: 6.5 G/DL (ref 6–8.2)
PROTHROMBIN TIME: 18.4 SEC (ref 12–14.6)
RBC # BLD AUTO: 3.02 M/UL (ref 4.2–5.4)
RH BLD: NORMAL
SODIUM SERPL-SCNC: 140 MMOL/L (ref 135–145)
TROPONIN T SERPL-MCNC: 19 NG/L (ref 6–19)
TROPONIN T SERPL-MCNC: 26 NG/L (ref 6–19)
WBC # BLD AUTO: 5 K/UL (ref 4.8–10.8)

## 2025-05-20 PROCEDURE — 86850 RBC ANTIBODY SCREEN: CPT

## 2025-05-20 PROCEDURE — 700105 HCHG RX REV CODE 258: Performed by: EMERGENCY MEDICINE

## 2025-05-20 PROCEDURE — 36415 COLL VENOUS BLD VENIPUNCTURE: CPT

## 2025-05-20 PROCEDURE — 85730 THROMBOPLASTIN TIME PARTIAL: CPT

## 2025-05-20 PROCEDURE — 86900 BLOOD TYPING SEROLOGIC ABO: CPT

## 2025-05-20 PROCEDURE — 70496 CT ANGIOGRAPHY HEAD: CPT

## 2025-05-20 PROCEDURE — 71045 X-RAY EXAM CHEST 1 VIEW: CPT

## 2025-05-20 PROCEDURE — 93005 ELECTROCARDIOGRAM TRACING: CPT | Mod: TC | Performed by: EMERGENCY MEDICINE

## 2025-05-20 PROCEDURE — 70498 CT ANGIOGRAPHY NECK: CPT

## 2025-05-20 PROCEDURE — 700117 HCHG RX CONTRAST REV CODE 255: Performed by: EMERGENCY MEDICINE

## 2025-05-20 PROCEDURE — 85610 PROTHROMBIN TIME: CPT

## 2025-05-20 PROCEDURE — 84484 ASSAY OF TROPONIN QUANT: CPT | Mod: 91

## 2025-05-20 PROCEDURE — 86901 BLOOD TYPING SEROLOGIC RH(D): CPT

## 2025-05-20 PROCEDURE — 80053 COMPREHEN METABOLIC PANEL: CPT

## 2025-05-20 PROCEDURE — 0042T CT-CEREBRAL PERFUSION ANALYSIS: CPT

## 2025-05-20 PROCEDURE — 85025 COMPLETE CBC W/AUTO DIFF WBC: CPT

## 2025-05-20 PROCEDURE — 70450 CT HEAD/BRAIN W/O DYE: CPT

## 2025-05-20 PROCEDURE — 99285 EMERGENCY DEPT VISIT HI MDM: CPT

## 2025-05-20 RX ORDER — SODIUM CHLORIDE, SODIUM LACTATE, POTASSIUM CHLORIDE, AND CALCIUM CHLORIDE .6; .31; .03; .02 G/100ML; G/100ML; G/100ML; G/100ML
1000 INJECTION, SOLUTION INTRAVENOUS ONCE
Status: COMPLETED | OUTPATIENT
Start: 2025-05-20 | End: 2025-05-20

## 2025-05-20 RX ORDER — AMLODIPINE BESYLATE 10 MG/1
10 TABLET ORAL DAILY
COMMUNITY

## 2025-05-20 RX ORDER — TOLTERODINE TARTRATE 1 MG/1
1 TABLET, EXTENDED RELEASE ORAL 2 TIMES DAILY
COMMUNITY

## 2025-05-20 RX ADMIN — IOHEXOL 40 ML: 350 INJECTION, SOLUTION INTRAVENOUS at 11:45

## 2025-05-20 RX ADMIN — SODIUM CHLORIDE, POTASSIUM CHLORIDE, SODIUM LACTATE AND CALCIUM CHLORIDE 1000 ML: 600; 310; 30; 20 INJECTION, SOLUTION INTRAVENOUS at 12:27

## 2025-05-20 RX ADMIN — IOHEXOL 80 ML: 350 INJECTION, SOLUTION INTRAVENOUS at 11:30

## 2025-05-20 ASSESSMENT — FIBROSIS 4 INDEX: FIB4 SCORE: 2.96

## 2025-05-20 NOTE — ED NOTES
Med Rec complete per family at bedside and pharmacy   Allergies reviewed  Antibiotics in the past 30 days:no  Anticoagulant in past 14 days:yes  Anticoagulant:Eliquis 5 mg  Last dose:05/19/25  Pharmacy patient utilizes:[facility]    Patient receives Radiation and Chemotherapy from [facility] [phone]    Radiation and Chemo received on April 9th or 10th (per son at bedside)    Patient is scheduled tomorrow for chemo and radiation with [facility]    Verified with pharmacy medications

## 2025-05-20 NOTE — ED PROVIDER NOTES
ED Provider Note    CHIEF COMPLAINT  Chief Complaint   Patient presents with    Possible Stroke     Pt reports blurred vision, dizziness, and a headache that began @ 0820. Per report, pt experienced expressive aphasia @ 0945. All symptoms resolved by the time EMS arrived @ 1000       EXTERNAL RECORDS REVIEWED  Inpatient Notes history of previous ischemic stroke with right-sided deficit also history of of vaginal cancer with metastasis.    HPI/ROS:  LIMITATION TO HISTORY     OUTSIDE HISTORIAN(S):      Funmi Veronica is a 79 y.o. female who presents to the emergency department chief complaint of headache and right-sided weakness.  Woke up at 830 was having severe headache and was having some minor aphasia dysarthria and worsening right sided weakness.  She also reports that her visual disturbance and cannot see herself in the mirror temporarily.  Previous history of ischemic stroke.  Does take Eliquis for such.  She also has history of vaginal cancer with metastasis and is scheduled to undergo radiation therapy of her lungs tomorrow.  No fevers chills cough no abdominal pain no problem with urination or bowel movement no other acute symptom change or concern at this time.    PAST MEDICAL HISTORY   has a past medical history of Arthritis, Bronchitis, CAD (coronary artery disease) (03/12/2012), Cancer ([facility]), Dental disorder, Depression (03/12/2012), Gynecological disorder, Hemorrhagic disorder ([facility]), High cholesterol, Hyperlipidemia (03/12/2012), Hypertension, Indigestion, Myocardial infarct ([facility]), Nicotine dependence (03/12/2012), Palpitations, Pneumonia, Rheumatic fever, Rheumatoid arthritis(714.0) (03/12/2012), Snoring (03/12/2012), and Urinary incontinence.    SURGICAL HISTORY   has a past surgical history that includes zzz cardiac cath; other cardiac surgery; other; vaginal hysterectomy total; colonoscopy-flexible (N/A, 12/24/2024); biopsy, vulva, vagina, or perineum (N/A, 12/24/2024); and mass excision  "general (N/A, 12/24/2024).    FAMILY HISTORY  Family History   Problem Relation Age of Onset    Heart Disease Mother 60        MI    Heart Disease Brother 38        MI       SOCIAL HISTORY  Social History     Tobacco Use    Smoking status: Every Day     Current packs/day: 0.50     Average packs/day: 0.5 packs/day for 53.0 years (26.5 ttl pk-yrs)     Types: Cigarettes    Smokeless tobacco: Never   Vaping Use    Vaping status: Never Used   Substance and Sexual Activity    Alcohol use: Yes     Comment: occasionally    Drug use: No    Sexual activity: Not on file       CURRENT MEDICATIONS  Home Medications    **Home medications have not yet been reviewed for this encounter**       Audit from Redirected Encounters    **Home medications have not yet been reviewed for this encounter**         ALLERGIES  Allergies[1]    PHYSICAL EXAM  VITAL SIGNS: /74   Pulse 62   Temp 36.7 °C (98 °F)   Resp 16   Ht 1.626 m (5' 4\")   Wt 54.4 kg (120 lb)   SpO2 98%   BMI 20.60 kg/m²      Pulse ox interpretation: I interpret this pulse ox as normal.  Constitutional: Alert and oriented x 3, minimal distress  HEENT: Atraumatic normocephalic, pupils are equal round reactive to light extraocular movements are intact. The nares is clear, external ears are normal, mouth shows moist mucous membranes normal dentition for age  Neck: Supple, no JVD no tracheal deviation  Cardiovascular: Regular rate and rhythm no murmur rub or gallop 2+ pulses peripherally x4  Thorax & Lungs: No respiratory distress, no wheezes rales or rhonchi, No chest tenderness.   GI: Soft nontender nondistended positive bowel sounds, no peritoneal signs  Skin: Warm dry no acute rash or lesion  Musculoskeletal: Moving all extremities with full range and 5 of 5 strength no acute  deformity  Neurologic: Minimal right-sided weakness in comparison the left upper and lower extremity.  No facial asymmetry no dysarthria no aphasia no cerebellar dysfunction no extinction, NIH " 2  Psychiatric: Appropriate affect for situation at this time      EKG/LABS  Results for orders placed or performed during the hospital encounter of 05/20/25   CBC WITH DIFFERENTIAL    Collection Time: 05/20/25 10:53 AM   Result Value Ref Range    WBC 5.0 4.8 - 10.8 K/uL    RBC 3.02 (L) 4.20 - 5.40 M/uL    Hemoglobin 9.6 (L) 12.0 - 16.0 g/dL    Hematocrit 30.4 (L) 37.0 - 47.0 %    .7 (H) 81.4 - 97.8 fL    MCH 31.8 27.0 - 33.0 pg    MCHC 31.6 (L) 32.2 - 35.5 g/dL    RDW 60.7 (H) 35.9 - 50.0 fL    Platelet Count 137 (L) 164 - 446 K/uL    MPV 10.2 9.0 - 12.9 fL    Neutrophils-Polys 64.20 44.00 - 72.00 %    Lymphocytes 15.20 (L) 22.00 - 41.00 %    Monocytes 13.70 (H) 0.00 - 13.40 %    Eosinophils 5.30 0.00 - 6.90 %    Basophils 0.80 0.00 - 1.80 %    Immature Granulocytes 0.80 0.00 - 0.90 %    Nucleated RBC 0.00 0.00 - 0.20 /100 WBC    Neutrophils (Absolute) 3.18 1.82 - 7.42 K/uL    Lymphs (Absolute) 0.75 (L) 1.00 - 4.80 K/uL    Monos (Absolute) 0.68 0.00 - 0.85 K/uL    Eos (Absolute) 0.26 0.00 - 0.51 K/uL    Baso (Absolute) 0.04 0.00 - 0.12 K/uL    Immature Granulocytes (abs) 0.04 0.00 - 0.11 K/uL    NRBC (Absolute) 0.00 K/uL   COMP METABOLIC PANEL    Collection Time: 05/20/25 10:53 AM   Result Value Ref Range    Sodium 140 135 - 145 mmol/L    Potassium 5.6 (H) 3.6 - 5.5 mmol/L    Chloride 111 96 - 112 mmol/L    Co2 17 (L) 20 - 33 mmol/L    Anion Gap 12.0 7.0 - 16.0    Glucose 98 65 - 99 mg/dL    Bun 41 (H) 8 - 22 mg/dL    Creatinine 1.48 (H) 0.50 - 1.40 mg/dL    Calcium 9.1 8.5 - 10.5 mg/dL    Correct Calcium 9.3 8.5 - 10.5 mg/dL    AST(SGOT) 19 12 - 45 U/L    ALT(SGPT) 8 2 - 50 U/L    Alkaline Phosphatase 104 (H) 30 - 99 U/L    Total Bilirubin <0.2 0.1 - 1.5 mg/dL    Albumin 3.8 3.2 - 4.9 g/dL    Total Protein 6.5 6.0 - 8.2 g/dL    Globulin 2.7 1.9 - 3.5 g/dL    A-G Ratio 1.4 g/dL   PROTHROMBIN TIME    Collection Time: 05/20/25 10:53 AM   Result Value Ref Range    PT 18.4 (H) 12.0 - 14.6 sec    INR 1.53 (H)  0.87 - 1.13   APTT    Collection Time: 25 10:53 AM   Result Value Ref Range    APTT 32.8 24.7 - 36.0 sec   COD (ADULT)    Collection Time: 25 10:53 AM   Result Value Ref Range    ABO Grouping Only O     Rh Grouping Only POS     Antibody Screen-Cod NEG    TROPONIN    Collection Time: 25 10:53 AM   Result Value Ref Range    Troponin T 26 (H) 6 - 19 ng/L   ESTIMATED GFR    Collection Time: 25 10:53 AM   Result Value Ref Range    GFR (CKD-EPI) 36 (A) >60 mL/min/1.73 m 2   TROPONIN    Collection Time: 25  1:19 PM   Result Value Ref Range    Troponin T 19 6 - 19 ng/L   EKG (NOW)    Collection Time: 25  3:40 PM   Result Value Ref Range    Report       [facility] Emergency Dept.    Test Date:  2025  Pt Name:    FOREST PATINO               Department: ER  MRN:        [id number]                      Room:  Gender:     Female                       Technician: [id number]  :        1945                   Requested By:ER TRIAGE PROTOCOL  Order #:    [id number]                    Reading MD: MARILEE BAILEY MD    Measurements  Intervals                                Axis  Rate:       62                           P:          71  MA:         139                          QRS:        81  QRSD:       121                          T:          73  QT:         410  QTc:        417    Interpretive Statements  Normal sinus rhythm at a rate of 62.  There is no ST elevation there is no ST  depression there is no T wave inversion normal axis normal intervals  Electronically Signed On 2025 15:40:45 PDT by MARILEE BAILEY MD        I have independently interpreted this EKG    RADIOLOGY/PROCEDURES   I have independently interpreted the diagnostic imaging associated with this visit and am waiting the final reading from the radiologist.   My preliminary interpretation is as follows:   No acute intracranial hemorrhage    Radiologist interpretation:  DX-CHEST-PORTABLE (1 VIEW)  "   (Results Pending)   CT-HEAD W/O    (Results Pending)   CT-CEREBRAL PERFUSION ANALYSIS    (Results Pending)   CT-CTA HEAD WITH & W/O-POST PROCESS    (Results Pending)   CT-CTA NECK WITH & W/O-POST PROCESSING    (Results Pending)       COURSE & MEDICAL DECISION MAKING    ASSESSMENT, COURSE AND PLAN  Care Narrative: Patient arrived as a stroke alert.  Thankfully all imaging is unremarkable for acute lesion at this time.  Patient has previous history of stroke is already on Eliquis all of her symptoms have resolved at this time.  I do not see any benefit from admission to the hospital at this time.  Patient also has extensive other care plans for metastatic disease which are supposed to initiate tomorrow.  I do not see any reason why she should not participate in this.  She had minimal elevation of troponin at arrival and minimal dehydration with the minimal elevation BUN and creatinine very similar to previous laboratory evaluation.  At this time she is feeling much better she is asymptomatic she is given instructions to return for any further visual disturbance dysarthria aphasia headache loss of balance weakness any other acute symptom change or concern otherwise discharged in stable and improved condition.      ADDITIONAL PROBLEMS MANAGED      DISPOSITION AND DISCUSSIONS    I have discussed management of the patient with the following physicians and JR's:      Discussion of management with other QHP or appropriate source(s):      Escalation of care considered, and ultimately not performed:acute inpatient care management, however at this time, the patient is most appropriate for outpatient management    Barriers to care at this time, including but not limited to: .     Decision tools and prescription drugs considered including, but not limited to: .  BP (!) 159/69   Pulse (!) 55   Temp 36.7 °C (98 °F)   Resp 17   Ht 1.626 m (5' 4\")   Wt 54.4 kg (120 lb)   SpO2 97%   BMI 20.60 kg/m²     Pankaj España, " M.D.  [address] [facility]  [address] [place]  [place] [address]  [phone]          [facility], Emergency Dept  [address] [address] [phone]    in 12-24 hours if symptoms persist, immediately If symptoms worsen, or if you develop any other symptoms or concerns        FINAL DIAGNOSIS  1. Weakness Inactive   2. Acute nonintractable headache, unspecified headache type         Electronically signed by: Salvador Cabrales M.D.           [1]   Allergies  Allergen Reactions    Atorvastatin Rash     Rash      Augmentin     Gold Salts

## 2025-05-20 NOTE — ED TRIAGE NOTES
Chief Complaint   Patient presents with    Possible Stroke     Pt reports blurred vision, dizziness, and a headache that began @ 0820. Per report, pt experienced expressive aphasia @ 0945. All symptoms resolved by the time EMS arrived @ 1000     Pt MICHELLE REMSA 21 from home for above complaint.   FSBS 118 en route.  Hx of CVA aprox one month ago. Pt currently on eloquis.

## (undated) DEVICE — SENSOR OXIMETER ADULT SPO2 RD SET (20EA/BX)

## (undated) DEVICE — LACTATED RINGERS INJ 1000 ML - (14EA/CA 60CA/PF)

## (undated) DEVICE — CANNULA O2 COMFORT SOFT EAR ADULT 7 FT TUBING (50/CA)

## (undated) DEVICE — TUBE CONNECTING SUCTION - CLEAR PLASTIC STERILE 72 IN (50EA/CA)

## (undated) DEVICE — SET EXTENSION WITH 2 PORTS (48EA/CA) ***PART #2C8610 IS A SUBSTITUTE*****

## (undated) DEVICE — TRAY SRGPRP PVP IOD WT PRP - (20/CA)

## (undated) DEVICE — ELECTRODE DUAL RETURN W/ CORD - (50/PK)

## (undated) DEVICE — GLOVE SZ 6 BIOGEL PI MICRO - PF LF (50PR/BX 4BX/CA)

## (undated) DEVICE — PAD SANITARY 11IN MAXI IND WRAPPED (12EA/PK 24PK/CA)

## (undated) DEVICE — SUTURE 2-0 MONOCRYL SH

## (undated) DEVICE — CANISTER SUCTION RIGID RED 1500CC (40EA/CA)

## (undated) DEVICE — SLEEVE, VASO, THIGH, MED

## (undated) DEVICE — TUBING CLEARLINK DUO-VENT - C-FLO (48EA/CA)

## (undated) DEVICE — SET LEADWIRE 5 LEAD BEDSIDE DISPOSABLE ECG (1SET OF 5/EA)

## (undated) DEVICE — CANISTER SUCTION 3000ML MECHANICAL FILTER AUTO SHUTOFF MEDI-VAC NONSTERILE LF DISP (40EA/CA)

## (undated) DEVICE — SUTURE GENERAL

## (undated) DEVICE — Device

## (undated) DEVICE — KIT CUSTOM PROCEDURE SINGLE FOR ENDO (15/CA)

## (undated) DEVICE — GLOVE COTTON STERILE (50/CA)

## (undated) DEVICE — COVER LIGHT HANDLE ALC PLUS DISP (18EA/BX)

## (undated) DEVICE — BRIEF STRETCH MATERNITY M/L - FITS 20-60IN (5EA/BG 20BG/CA)

## (undated) DEVICE — GLOVE BIOGEL PI ORTHO SZ 7.5 PF LF (40PR/BX)

## (undated) DEVICE — SUTURE 2-0 VICRYL PLUS SH - 27 INCH (36/BX)

## (undated) DEVICE — GLOVE BIOGEL PI INDICATOR SZ 6.0 SURGICAL PF LF -(200PR/CA)

## (undated) DEVICE — SODIUM CHL IRRIGATION 0.9% 1000ML (12EA/CA)

## (undated) DEVICE — SUCTION INSTRUMENT YANKAUER BULBOUS TIP W/O VENT (50EA/CA)

## (undated) DEVICE — MASK WITH FACE SHIELD (25/BX 4BX/CA)

## (undated) DEVICE — CONTAINER, SPECIMEN, STERILE

## (undated) DEVICE — GOWN WARMING STANDARD FLEX - (30/CA)